# Patient Record
Sex: MALE | Race: WHITE | NOT HISPANIC OR LATINO
[De-identification: names, ages, dates, MRNs, and addresses within clinical notes are randomized per-mention and may not be internally consistent; named-entity substitution may affect disease eponyms.]

---

## 2018-05-24 PROBLEM — Z00.00 ENCOUNTER FOR PREVENTIVE HEALTH EXAMINATION: Status: ACTIVE | Noted: 2018-05-24

## 2023-06-08 DIAGNOSIS — M17.0 BILATERAL PRIMARY OSTEOARTHRITIS OF KNEE: ICD-10-CM

## 2023-06-09 ENCOUNTER — APPOINTMENT (OUTPATIENT)
Dept: ORTHOPEDIC SURGERY | Facility: CLINIC | Age: 68
End: 2023-06-09
Payer: MEDICARE

## 2023-06-09 VITALS
SYSTOLIC BLOOD PRESSURE: 156 MMHG | BODY MASS INDEX: 24.99 KG/M2 | DIASTOLIC BLOOD PRESSURE: 78 MMHG | HEIGHT: 65 IN | HEART RATE: 50 BPM | WEIGHT: 150 LBS

## 2023-06-09 DIAGNOSIS — M16.0 BILATERAL PRIMARY OSTEOARTHRITIS OF HIP: ICD-10-CM

## 2023-06-09 PROCEDURE — 99204 OFFICE O/P NEW MOD 45 MIN: CPT

## 2023-06-09 PROCEDURE — 73522 X-RAY EXAM HIPS BI 3-4 VIEWS: CPT

## 2023-07-06 ENCOUNTER — NON-APPOINTMENT (OUTPATIENT)
Age: 68
End: 2023-07-06

## 2023-09-27 ENCOUNTER — OUTPATIENT (OUTPATIENT)
Dept: OUTPATIENT SERVICES | Facility: HOSPITAL | Age: 68
LOS: 1 days | End: 2023-09-27
Payer: MEDICARE

## 2023-09-27 VITALS
RESPIRATION RATE: 14 BRPM | WEIGHT: 148.37 LBS | SYSTOLIC BLOOD PRESSURE: 156 MMHG | OXYGEN SATURATION: 100 % | TEMPERATURE: 98 F | HEART RATE: 55 BPM | DIASTOLIC BLOOD PRESSURE: 69 MMHG | HEIGHT: 63.5 IN

## 2023-09-27 DIAGNOSIS — Z98.890 OTHER SPECIFIED POSTPROCEDURAL STATES: Chronic | ICD-10-CM

## 2023-09-27 DIAGNOSIS — M16.12 UNILATERAL PRIMARY OSTEOARTHRITIS, LEFT HIP: ICD-10-CM

## 2023-09-27 DIAGNOSIS — Z01.818 ENCOUNTER FOR OTHER PREPROCEDURAL EXAMINATION: ICD-10-CM

## 2023-09-27 LAB
A1C WITH ESTIMATED AVERAGE GLUCOSE RESULT: 6 % — HIGH (ref 4–5.6)
ALBUMIN SERPL ELPH-MCNC: 3.7 G/DL — SIGNIFICANT CHANGE UP (ref 3.3–5)
ALP SERPL-CCNC: 45 U/L — SIGNIFICANT CHANGE UP (ref 30–120)
ALT FLD-CCNC: 32 U/L — SIGNIFICANT CHANGE UP (ref 10–60)
ANION GAP SERPL CALC-SCNC: 8 MMOL/L — SIGNIFICANT CHANGE UP (ref 5–17)
APTT BLD: 35.2 SEC — SIGNIFICANT CHANGE UP (ref 24.5–35.6)
AST SERPL-CCNC: 19 U/L — SIGNIFICANT CHANGE UP (ref 10–40)
BILIRUB SERPL-MCNC: 0.4 MG/DL — SIGNIFICANT CHANGE UP (ref 0.2–1.2)
BLD GP AB SCN SERPL QL: SIGNIFICANT CHANGE UP
BUN SERPL-MCNC: 16 MG/DL — SIGNIFICANT CHANGE UP (ref 7–23)
CALCIUM SERPL-MCNC: 9.7 MG/DL — SIGNIFICANT CHANGE UP (ref 8.4–10.5)
CHLORIDE SERPL-SCNC: 103 MMOL/L — SIGNIFICANT CHANGE UP (ref 96–108)
CO2 SERPL-SCNC: 29 MMOL/L — SIGNIFICANT CHANGE UP (ref 22–31)
CREAT SERPL-MCNC: 1.18 MG/DL — SIGNIFICANT CHANGE UP (ref 0.5–1.3)
EGFR: 67 ML/MIN/1.73M2 — SIGNIFICANT CHANGE UP
ESTIMATED AVERAGE GLUCOSE: 126 MG/DL — HIGH (ref 68–114)
GLUCOSE SERPL-MCNC: 117 MG/DL — HIGH (ref 70–99)
HCT VFR BLD CALC: 40.7 % — SIGNIFICANT CHANGE UP (ref 39–50)
HGB BLD-MCNC: 12.6 G/DL — LOW (ref 13–17)
INR BLD: 0.98 RATIO — SIGNIFICANT CHANGE UP (ref 0.85–1.18)
MCHC RBC-ENTMCNC: 27.3 PG — SIGNIFICANT CHANGE UP (ref 27–34)
MCHC RBC-ENTMCNC: 31 GM/DL — LOW (ref 32–36)
MCV RBC AUTO: 88.1 FL — SIGNIFICANT CHANGE UP (ref 80–100)
MRSA PCR RESULT.: SIGNIFICANT CHANGE UP
NRBC # BLD: 0 /100 WBCS — SIGNIFICANT CHANGE UP (ref 0–0)
PLATELET # BLD AUTO: 228 K/UL — SIGNIFICANT CHANGE UP (ref 150–400)
POTASSIUM SERPL-MCNC: 5 MMOL/L — SIGNIFICANT CHANGE UP (ref 3.5–5.3)
POTASSIUM SERPL-SCNC: 5 MMOL/L — SIGNIFICANT CHANGE UP (ref 3.5–5.3)
PROT SERPL-MCNC: 7.8 G/DL — SIGNIFICANT CHANGE UP (ref 6–8.3)
PROTHROM AB SERPL-ACNC: 10.7 SEC — SIGNIFICANT CHANGE UP (ref 9.5–13)
RBC # BLD: 4.62 M/UL — SIGNIFICANT CHANGE UP (ref 4.2–5.8)
RBC # FLD: 15.6 % — HIGH (ref 10.3–14.5)
S AUREUS DNA NOSE QL NAA+PROBE: SIGNIFICANT CHANGE UP
SODIUM SERPL-SCNC: 140 MMOL/L — SIGNIFICANT CHANGE UP (ref 135–145)
WBC # BLD: 7.35 K/UL — SIGNIFICANT CHANGE UP (ref 3.8–10.5)
WBC # FLD AUTO: 7.35 K/UL — SIGNIFICANT CHANGE UP (ref 3.8–10.5)

## 2023-09-27 PROCEDURE — 87640 STAPH A DNA AMP PROBE: CPT

## 2023-09-27 PROCEDURE — 85027 COMPLETE CBC AUTOMATED: CPT

## 2023-09-27 PROCEDURE — 86900 BLOOD TYPING SEROLOGIC ABO: CPT

## 2023-09-27 PROCEDURE — 83036 HEMOGLOBIN GLYCOSYLATED A1C: CPT

## 2023-09-27 PROCEDURE — 86901 BLOOD TYPING SEROLOGIC RH(D): CPT

## 2023-09-27 PROCEDURE — 80053 COMPREHEN METABOLIC PANEL: CPT

## 2023-09-27 PROCEDURE — 36415 COLL VENOUS BLD VENIPUNCTURE: CPT

## 2023-09-27 PROCEDURE — 86850 RBC ANTIBODY SCREEN: CPT

## 2023-09-27 PROCEDURE — 87641 MR-STAPH DNA AMP PROBE: CPT

## 2023-09-27 PROCEDURE — 85610 PROTHROMBIN TIME: CPT

## 2023-09-27 PROCEDURE — G0463: CPT

## 2023-09-27 PROCEDURE — 85730 THROMBOPLASTIN TIME PARTIAL: CPT

## 2023-09-27 NOTE — H&P PST ADULT - PROBLEM SELECTOR PLAN 1
left THR. medical and cardiac clearances requested. instructed to stop tadalafil and diclofenac 1 week prior to surgery and instructed to take carvedilol AM of surgery with sips of water. gatorade and surgical wash instructions reviewed and verbalized understanding.

## 2023-09-27 NOTE — H&P PST ADULT - SKIN
Violent ideation/threat/speech/Substance abuse/Noncompliance with treatment/Irritability warm and dry/color normal/normal/no rashes/no ulcers Substance abuse/Noncompliance with treatment

## 2023-09-27 NOTE — H&P PST ADULT - COMMENTS
denies any current cold or flu like symptoms, including fever, cough, sinus congestion, body aches or chills   last episode of pneumonia 10 years ago

## 2023-09-27 NOTE — H&P PST ADULT - CONSTITUTIONAL
Transition Of Care Note    Patient discharged from [facility] ED for neck pain, strain. Medical History:     Past Medical History:   Diagnosis Date    Anxiety     Cataract     GERD (gastroesophageal reflux disease)     Glaucoma     HCVD (hypertensive cardiovascular disease)     Ill-defined condition     C3-6 cervical stenosis    Mixed hyperlipidemia     Osteoporosis 12/15    Paroxysmal atrial fibrillation (HCC)     RBBB (right bundle branch block)     Renal stones        Care Manager contacted the patient by telephone to perform post ED discharge assessment. Verified  and zip code with patient as identifiers. Provided introduction to self, and explanation of the Nurse Care Manager role. Medication:   Performed medication reconciliation with patient, and patient verbalizes understanding of administration of home medications. There were no barriers to obtaining medications identified at this time. Support system:  patient and spouse    Discharge Instructions :  Reviewed discharge instructions with patient. Patient verbalizes understanding of discharge instructions and follow-up care. Red Flags:  Numbness or tingling. ¨Weakness. ¨Pain. ·You lose bladder or bowel control. Advance Care Planning:   Patient was offered the opportunity to discuss advance care planning:  no     Does patient have an Advance Directive:  no   If no, did you provide information on Caring Connections?  no     PCP/Specialist follow up: Patient states she will follow up with Katt Braxton MD.  Reviewed red flags with patient, and patient verbalizes understanding. Patient given an opportunity to ask questions. No other clinical/social/functional needs noted. The patient agrees to contact the PCP office for questions related to their healthcare. The patient expressed thanks, offered no additional questions and ended the call. normal/well-groomed/no distress

## 2023-09-27 NOTE — H&P PST ADULT - NSANTHOSAYNRD_GEN_A_CORE
neck/No. ANDRES screening performed.  STOP BANG Legend: 0-2 = LOW Risk; 3-4 = INTERMEDIATE Risk; 5-8 = HIGH Risk neck 18 inches/No. ANDRES screening performed.  STOP BANG Legend: 0-2 = LOW Risk; 3-4 = INTERMEDIATE Risk; 5-8 = HIGH Risk

## 2023-09-27 NOTE — H&P PST ADULT - ALLERGIC/IMMUNOLOGIC
Date of Discharge: 9/5/23    Reason for Discharge: Goals/outcomes met     Problem: PAIN - ADULT  Goal: Verbalizes/displays adequate comfort level or baseline comfort level  Description: Interventions:  - Encourage patient to monitor pain and request assistance  - Assess pain using appropriate pain scale  - Administer analgesics based on type and severity of pain and evaluate response  - Implement non-pharmacological measures as appropriate and evaluate response  - Consider cultural and social influences on pain and pain management  - Notify physician/advanced practitioner if interventions unsuccessful or patient reports new pain  Outcome: Progressing     Problem: INFECTION - ADULT  Goal: Absence or prevention of progression during hospitalization  Description: INTERVENTIONS:  - Assess and monitor for signs and symptoms of infection  - Monitor lab/diagnostic results  - Monitor all insertion sites, i e  indwelling lines, tubes, and drains  - Monitor endotracheal if appropriate and nasal secretions for changes in amount and color  - [place] appropriate cooling/warming therapies per order  - Administer medications as ordered  - Instruct and encourage patient and family to use good hand hygiene technique  - Identify and instruct in appropriate isolation precautions for identified infection/condition  Outcome: Progressing  Goal: Absence of fever/infection during neutropenic period  Description: INTERVENTIONS:  - Monitor WBC    Outcome: Progressing     Problem: SAFETY ADULT  Goal: Patient will remain free of falls  Description: INTERVENTIONS:  - Educate patient/family on patient safety including physical limitations  - Instruct patient to call for assistance with activity   - Consult OT/PT to assist with strengthening/mobility   - Keep Call bell within reach  - Keep bed low and locked with side rails adjusted as appropriate  - Keep care items and personal belongings within reach  - Initiate and maintain comfort rounds  - Make Fall Risk Sign visible to staff  - Offer Toileting every 2 Hours, in advance of need  - Initiate/Maintain bed alarm  - Obtain necessary fall risk management equipment: yellow socks  - Apply yellow socks and bracelet for high fall risk patients  - Consider moving patient to room near nurses station  Outcome: Progressing  Goal: Maintain or return to baseline ADL function  Description: INTERVENTIONS:  -  Assess patient's ability to carry out ADLs; assess patient's baseline for ADL function and identify physical deficits which impact ability to perform ADLs (bathing, care of mouth/teeth, toileting, grooming, dressing, etc )  - Assess/evaluate cause of self-care deficits   - Assess range of motion  - Assess patient's mobility; develop plan if impaired  - Assess patient's need for assistive devices and provide as appropriate  - Encourage maximum independence but intervene and supervise when necessary  - Involve family in performance of ADLs  - Assess for home care needs following discharge   - Consider OT consult to assist with ADL evaluation and planning for discharge  - Provide patient education as appropriate  Outcome: Progressing  Goal: Maintains/Returns to pre admission functional level  Description: INTERVENTIONS:  - Perform BMAT or MOVE assessment daily    - Set and communicate daily mobility goal to care team and patient/family/caregiver  - Collaborate with rehabilitation services on mobility goals if consulted  - Perform Range of Motion 4 times a day  - Reposition patient every 2 hours    - Dangle patient 3 times a day  - Stand patient 3 times a day  - Ambulate patient 3 times a day  - Out of bed to chair 3 times a day   - Out of bed for meals 3 times a day  - Out of bed for toileting  - Record patient progress and toleration of activity level   Outcome: Progressing     Problem: DISCHARGE PLANNING  Goal: Discharge to home or other facility with appropriate resources  Description: INTERVENTIONS:  - Identify barriers to discharge w/patient and caregiver  - Arrange for needed discharge resources and transportation as appropriate  - Identify discharge learning needs (meds, wound care, etc )  - Arrange for interpretive services to assist at discharge as needed  - Refer to Case Management Department for coordinating discharge planning if the patient needs post-hospital services based on physician/advanced practitioner order or complex needs related to functional status, cognitive ability, or social support system  Outcome: Progressing     Problem: Knowledge Deficit  Goal: Patient/family/caregiver demonstrates understanding of disease process, treatment plan, medications, and discharge instructions  Description: Complete learning assessment and assess knowledge base    Interventions:  - Provide teaching at level of understanding  - Provide teaching via preferred learning methods  Outcome: Progressing     Problem: GASTROINTESTINAL - ADULT  Goal: Minimal or absence of nausea and/or vomiting  Description: INTERVENTIONS:  - Administer IV fluids if ordered to ensure adequate hydration  - Maintain NPO status until nausea and vomiting are resolved  - Nasogastric tube if ordered  - Administer ordered antiemetic medications as needed  - Provide nonpharmacologic comfort measures as appropriate  - Advance diet as tolerated, if ordered  - Consider nutrition services referral to assist patient with adequate nutrition and appropriate food choices  Outcome: Progressing  Goal: Maintains adequate nutritional intake  Description: INTERVENTIONS:  - Monitor percentage of each meal consumed  - Identify factors contributing to decreased intake, treat as appropriate  - Assist with meals as needed  - Monitor I&O, weight, and lab values if indicated  - Obtain nutrition services referral as needed  Outcome: Progressing     Problem: GENITOURINARY - ADULT  Goal: Maintains or returns to baseline urinary function  Description: INTERVENTIONS:  - Assess urinary function  - Encourage oral fluids to ensure adequate hydration if ordered  - Administer IV fluids as ordered to ensure adequate hydration  - Administer ordered medications as needed  - Offer frequent toileting  - Follow urinary retention protocol if ordered  Outcome: Progressing negative

## 2023-09-27 NOTE — H&P PST ADULT - GASTROINTESTINAL
details… normal/soft/nontender/nondistended/normal active bowel sounds/no organomegaly/no masses palpable

## 2023-09-27 NOTE — H&P PST ADULT - NSICDXFAMILYHX_GEN_ALL_CORE_FT
FAMILY HISTORY:  Father  Still living? No  Family history of esophageal cancer, Age at diagnosis: Age Unknown  Family history of hypertension, Age at diagnosis: Age Unknown  Family history of type 2 diabetes mellitus, Age at diagnosis: Age Unknown  FHx: coronary artery disease, Age at diagnosis: Age Unknown    Mother  Still living? No  Family history of emphysema, Age at diagnosis: Age Unknown  Family history of heart attack, Age at diagnosis: Age Unknown  Family history of hyperlipidemia, Age at diagnosis: Age Unknown  Family history of lung cancer, Age at diagnosis: Age Unknown  Family history of stroke, Age at diagnosis: Age Unknown    Sibling  Still living? Yes, Estimated age: 51-60  Family history of hyperlipidemia, Age at diagnosis: Age Unknown  Family history of type 2 diabetes mellitus, Age at diagnosis: Age Unknown

## 2023-09-27 NOTE — H&P PST ADULT - NSICDXPROCEDURE_GEN_ALL_CORE_FT
PROCEDURES:  Total replacement of left hip joint by anterior approach 27-Sep-2023 07:14:10  Cesilia Rasheed

## 2023-09-27 NOTE — H&P PST ADULT - HISTORY OF PRESENT ILLNESS
68 yo male presents with 5 year history of bilateral hip pain. He was treated in the past with cortisone injections with temporary relief. Pain now 3/10 at rest and increased to 8-10/10 when standing from a sitting position and with prolonged activity. Pain radiates to the knee. Pain is relieved with diclofenac.   66 yo male presents with 5 year history of bilateral hip pain. He was treated in the past with cortisone injections with temporary relief. Pain now 3/10 at rest and increased to 8-10/10 when standing from a sitting position and with prolonged activity. Pain radiates to the knee. Pain is relieved with diclofenac. Scheduled for left THR on 10/11/23 and right THR on 10/18/23.

## 2023-09-27 NOTE — H&P PST ADULT - MUSCULOSKELETAL
details… bilateral hips/no joint swelling/no joint erythema/no joint warmth/no calf tenderness/decreased ROM/decreased ROM due to pain/strength 5/5 bilateral upper extremities/decreased strength/abnormal gait

## 2023-10-05 PROBLEM — Z86.79 PERSONAL HISTORY OF OTHER DISEASES OF THE CIRCULATORY SYSTEM: Chronic | Status: ACTIVE | Noted: 2023-09-27

## 2023-10-05 PROBLEM — Z92.29 PERSONAL HISTORY OF OTHER DRUG THERAPY: Chronic | Status: ACTIVE | Noted: 2023-09-27

## 2023-10-05 PROBLEM — Z87.898 PERSONAL HISTORY OF OTHER SPECIFIED CONDITIONS: Chronic | Status: ACTIVE | Noted: 2023-09-27

## 2023-10-05 PROBLEM — Z87.01 PERSONAL HISTORY OF PNEUMONIA (RECURRENT): Chronic | Status: ACTIVE | Noted: 2023-09-27

## 2023-10-05 PROBLEM — M54.31 SCIATICA, RIGHT SIDE: Chronic | Status: ACTIVE | Noted: 2023-09-27

## 2023-10-05 PROBLEM — M48.061 SPINAL STENOSIS, LUMBAR REGION WITHOUT NEUROGENIC CLAUDICATION: Chronic | Status: ACTIVE | Noted: 2023-09-27

## 2023-10-05 PROBLEM — M16.11 UNILATERAL PRIMARY OSTEOARTHRITIS, RIGHT HIP: Chronic | Status: ACTIVE | Noted: 2023-09-27

## 2023-10-05 PROBLEM — Z87.438 PERSONAL HISTORY OF OTHER DISEASES OF MALE GENITAL ORGANS: Chronic | Status: ACTIVE | Noted: 2023-09-27

## 2023-10-05 PROBLEM — Z87.2 PERSONAL HISTORY OF DISEASES OF THE SKIN AND SUBCUTANEOUS TISSUE: Chronic | Status: ACTIVE | Noted: 2023-09-27

## 2023-10-05 PROBLEM — M51.26 OTHER INTERVERTEBRAL DISC DISPLACEMENT, LUMBAR REGION: Chronic | Status: ACTIVE | Noted: 2023-09-27

## 2023-10-05 PROBLEM — K59.00 CONSTIPATION, UNSPECIFIED: Chronic | Status: ACTIVE | Noted: 2023-09-27

## 2023-10-05 PROBLEM — I10 ESSENTIAL (PRIMARY) HYPERTENSION: Chronic | Status: ACTIVE | Noted: 2023-09-27

## 2023-10-05 PROBLEM — J43.9 EMPHYSEMA, UNSPECIFIED: Chronic | Status: ACTIVE | Noted: 2023-09-27

## 2023-10-05 PROBLEM — M16.12 UNILATERAL PRIMARY OSTEOARTHRITIS, LEFT HIP: Chronic | Status: ACTIVE | Noted: 2023-09-27

## 2023-10-05 PROBLEM — M47.816 SPONDYLOSIS WITHOUT MYELOPATHY OR RADICULOPATHY, LUMBAR REGION: Chronic | Status: ACTIVE | Noted: 2023-09-27

## 2023-10-05 PROBLEM — Z87.19 PERSONAL HISTORY OF OTHER DISEASES OF THE DIGESTIVE SYSTEM: Chronic | Status: ACTIVE | Noted: 2023-09-27

## 2023-10-05 PROBLEM — Z87.442 PERSONAL HISTORY OF URINARY CALCULI: Chronic | Status: ACTIVE | Noted: 2023-09-27

## 2023-10-05 PROBLEM — I25.10 ATHEROSCLEROTIC HEART DISEASE OF NATIVE CORONARY ARTERY WITHOUT ANGINA PECTORIS: Chronic | Status: ACTIVE | Noted: 2023-09-27

## 2023-10-05 PROBLEM — Z86.16 PERSONAL HISTORY OF COVID-19: Chronic | Status: ACTIVE | Noted: 2023-09-27

## 2023-10-11 ENCOUNTER — APPOINTMENT (OUTPATIENT)
Dept: ORTHOPEDIC SURGERY | Facility: HOSPITAL | Age: 68
End: 2023-10-11

## 2023-10-11 ENCOUNTER — TRANSCRIPTION ENCOUNTER (OUTPATIENT)
Age: 68
End: 2023-10-11

## 2023-10-11 ENCOUNTER — INPATIENT (INPATIENT)
Facility: HOSPITAL | Age: 68
LOS: 0 days | Discharge: HOME | DRG: 470 | End: 2023-10-12
Attending: ORTHOPAEDIC SURGERY | Admitting: ORTHOPAEDIC SURGERY
Payer: COMMERCIAL

## 2023-10-11 VITALS
HEIGHT: 64 IN | TEMPERATURE: 97 F | HEART RATE: 53 BPM | RESPIRATION RATE: 15 BRPM | SYSTOLIC BLOOD PRESSURE: 145 MMHG | WEIGHT: 144.84 LBS | DIASTOLIC BLOOD PRESSURE: 57 MMHG | OXYGEN SATURATION: 100 %

## 2023-10-11 DIAGNOSIS — M16.12 UNILATERAL PRIMARY OSTEOARTHRITIS, LEFT HIP: ICD-10-CM

## 2023-10-11 DIAGNOSIS — Z98.890 OTHER SPECIFIED POSTPROCEDURAL STATES: Chronic | ICD-10-CM

## 2023-10-11 LAB — ABO RH CONFIRMATION: SIGNIFICANT CHANGE UP

## 2023-10-11 PROCEDURE — 99222 1ST HOSP IP/OBS MODERATE 55: CPT

## 2023-10-11 PROCEDURE — 27130 TOTAL HIP ARTHROPLASTY: CPT | Mod: LT

## 2023-10-11 DEVICE — CUP ACET EMPOWR CLUSTER 58MM APLHA H: Type: IMPLANTABLE DEVICE | Status: FUNCTIONAL

## 2023-10-11 DEVICE — SCREW ACET EMPOWR 6.5X35MM: Type: IMPLANTABLE DEVICE | Status: FUNCTIONAL

## 2023-10-11 DEVICE — SCREW ACET SZ 6.5X30MM: Type: IMPLANTABLE DEVICE | Status: FUNCTIONAL

## 2023-10-11 DEVICE — LINER ACET EMPOWR HXA NEUT 40MM ALPHA H: Type: IMPLANTABLE DEVICE | Status: FUNCTIONAL

## 2023-10-11 DEVICE — SLEEVE BIOLOX DELTA OPTION NEUTRAL: Type: IMPLANTABLE DEVICE | Status: FUNCTIONAL

## 2023-10-11 DEVICE — STEM BLADE EMPOWER SZ 11 LAT 132 DEG: Type: IMPLANTABLE DEVICE | Status: FUNCTIONAL

## 2023-10-11 DEVICE — HEAD FEM OPTION CERAMIC DELTA 40MM: Type: IMPLANTABLE DEVICE | Status: FUNCTIONAL

## 2023-10-11 DEVICE — K-WIRE MICROAIRE (THREADED) SINGLE TROCAR 4.8MM X 9": Type: IMPLANTABLE DEVICE | Status: FUNCTIONAL

## 2023-10-11 DEVICE — BONE WAX 2.5GM: Type: IMPLANTABLE DEVICE | Status: FUNCTIONAL

## 2023-10-11 RX ORDER — HYDROMORPHONE HYDROCHLORIDE 2 MG/ML
0.5 INJECTION INTRAMUSCULAR; INTRAVENOUS; SUBCUTANEOUS
Refills: 0 | Status: DISCONTINUED | OUTPATIENT
Start: 2023-10-11 | End: 2023-10-11

## 2023-10-11 RX ORDER — OXYCODONE HYDROCHLORIDE 5 MG/1
5 TABLET ORAL
Refills: 0 | Status: DISCONTINUED | OUTPATIENT
Start: 2023-10-11 | End: 2023-10-12

## 2023-10-11 RX ORDER — SODIUM CHLORIDE 9 MG/ML
500 INJECTION INTRAMUSCULAR; INTRAVENOUS; SUBCUTANEOUS ONCE
Refills: 0 | Status: COMPLETED | OUTPATIENT
Start: 2023-10-11 | End: 2023-10-11

## 2023-10-11 RX ORDER — INFLUENZA VIRUS VACCINE 15; 15; 15; 15 UG/.5ML; UG/.5ML; UG/.5ML; UG/.5ML
0.7 SUSPENSION INTRAMUSCULAR ONCE
Refills: 0 | Status: DISCONTINUED | OUTPATIENT
Start: 2023-10-11 | End: 2023-10-12

## 2023-10-11 RX ORDER — CEFAZOLIN SODIUM 1 G
2000 VIAL (EA) INJECTION ONCE
Refills: 0 | Status: COMPLETED | OUTPATIENT
Start: 2023-10-11 | End: 2023-10-11

## 2023-10-11 RX ORDER — POLYETHYLENE GLYCOL 3350 17 G/17G
17 POWDER, FOR SOLUTION ORAL AT BEDTIME
Refills: 0 | Status: DISCONTINUED | OUTPATIENT
Start: 2023-10-11 | End: 2023-10-12

## 2023-10-11 RX ORDER — ACETAMINOPHEN 500 MG
1000 TABLET ORAL ONCE
Refills: 0 | Status: COMPLETED | OUTPATIENT
Start: 2023-10-12 | End: 2023-10-11

## 2023-10-11 RX ORDER — SENNA PLUS 8.6 MG/1
2 TABLET ORAL AT BEDTIME
Refills: 0 | Status: DISCONTINUED | OUTPATIENT
Start: 2023-10-11 | End: 2023-10-12

## 2023-10-11 RX ORDER — CEFAZOLIN SODIUM 1 G
2000 VIAL (EA) INJECTION EVERY 8 HOURS
Refills: 0 | Status: COMPLETED | OUTPATIENT
Start: 2023-10-11 | End: 2023-10-12

## 2023-10-11 RX ORDER — LOSARTAN POTASSIUM 100 MG/1
100 TABLET, FILM COATED ORAL AT BEDTIME
Refills: 0 | Status: DISCONTINUED | OUTPATIENT
Start: 2023-10-13 | End: 2023-10-12

## 2023-10-11 RX ORDER — PANTOPRAZOLE SODIUM 20 MG/1
40 TABLET, DELAYED RELEASE ORAL
Refills: 0 | Status: DISCONTINUED | OUTPATIENT
Start: 2023-10-11 | End: 2023-10-12

## 2023-10-11 RX ORDER — DICLOFENAC SODIUM 75 MG/1
1 TABLET, DELAYED RELEASE ORAL
Refills: 0 | DISCHARGE

## 2023-10-11 RX ORDER — AMLODIPINE BESYLATE 2.5 MG/1
5 TABLET ORAL AT BEDTIME
Refills: 0 | Status: DISCONTINUED | OUTPATIENT
Start: 2023-10-13 | End: 2023-10-12

## 2023-10-11 RX ORDER — ACETAMINOPHEN 500 MG
1000 TABLET ORAL ONCE
Refills: 0 | Status: COMPLETED | OUTPATIENT
Start: 2023-10-11 | End: 2023-10-11

## 2023-10-11 RX ORDER — TRANEXAMIC ACID 100 MG/ML
1000 INJECTION, SOLUTION INTRAVENOUS ONCE
Refills: 0 | Status: COMPLETED | OUTPATIENT
Start: 2023-10-11 | End: 2023-10-11

## 2023-10-11 RX ORDER — CELECOXIB 200 MG/1
200 CAPSULE ORAL EVERY 12 HOURS
Refills: 0 | Status: DISCONTINUED | OUTPATIENT
Start: 2023-10-11 | End: 2023-10-12

## 2023-10-11 RX ORDER — CHLORHEXIDINE GLUCONATE 213 G/1000ML
1 SOLUTION TOPICAL ONCE
Refills: 0 | Status: COMPLETED | OUTPATIENT
Start: 2023-10-11 | End: 2023-10-11

## 2023-10-11 RX ORDER — ONDANSETRON 8 MG/1
4 TABLET, FILM COATED ORAL EVERY 6 HOURS
Refills: 0 | Status: DISCONTINUED | OUTPATIENT
Start: 2023-10-11 | End: 2023-10-12

## 2023-10-11 RX ORDER — CELECOXIB 200 MG/1
1 CAPSULE ORAL
Qty: 56 | Refills: 0
Start: 2023-10-11 | End: 2023-11-07

## 2023-10-11 RX ORDER — DOCUSATE SODIUM 100 MG
1 CAPSULE ORAL
Refills: 0 | DISCHARGE

## 2023-10-11 RX ORDER — OXYCODONE HYDROCHLORIDE 5 MG/1
10 TABLET ORAL
Refills: 0 | Status: DISCONTINUED | OUTPATIENT
Start: 2023-10-11 | End: 2023-10-12

## 2023-10-11 RX ORDER — OMEPRAZOLE 10 MG/1
1 CAPSULE, DELAYED RELEASE ORAL
Qty: 30 | Refills: 0
Start: 2023-10-11

## 2023-10-11 RX ORDER — ENOXAPARIN SODIUM 100 MG/ML
40 INJECTION SUBCUTANEOUS EVERY 24 HOURS
Refills: 0 | Status: DISCONTINUED | OUTPATIENT
Start: 2023-10-12 | End: 2023-10-12

## 2023-10-11 RX ORDER — APREPITANT 80 MG/1
40 CAPSULE ORAL ONCE
Refills: 0 | Status: COMPLETED | OUTPATIENT
Start: 2023-10-11 | End: 2023-10-11

## 2023-10-11 RX ORDER — ATORVASTATIN CALCIUM 80 MG/1
20 TABLET, FILM COATED ORAL AT BEDTIME
Refills: 0 | Status: DISCONTINUED | OUTPATIENT
Start: 2023-10-11 | End: 2023-10-12

## 2023-10-11 RX ORDER — HYDROMORPHONE HYDROCHLORIDE 2 MG/ML
0.5 INJECTION INTRAMUSCULAR; INTRAVENOUS; SUBCUTANEOUS
Refills: 0 | Status: DISCONTINUED | OUTPATIENT
Start: 2023-10-11 | End: 2023-10-12

## 2023-10-11 RX ORDER — ENOXAPARIN SODIUM 100 MG/ML
40 INJECTION SUBCUTANEOUS
Qty: 5 | Refills: 0
Start: 2023-10-11 | End: 2023-10-15

## 2023-10-11 RX ORDER — SODIUM CHLORIDE 9 MG/ML
1000 INJECTION, SOLUTION INTRAVENOUS
Refills: 0 | Status: DISCONTINUED | OUTPATIENT
Start: 2023-10-11 | End: 2023-10-12

## 2023-10-11 RX ORDER — ONDANSETRON 8 MG/1
4 TABLET, FILM COATED ORAL ONCE
Refills: 0 | Status: DISCONTINUED | OUTPATIENT
Start: 2023-10-11 | End: 2023-10-11

## 2023-10-11 RX ORDER — ACETAMINOPHEN 500 MG
1000 TABLET ORAL EVERY 8 HOURS
Refills: 0 | Status: DISCONTINUED | OUTPATIENT
Start: 2023-10-12 | End: 2023-10-12

## 2023-10-11 RX ORDER — HYDROMORPHONE HYDROCHLORIDE 2 MG/ML
0.2 INJECTION INTRAMUSCULAR; INTRAVENOUS; SUBCUTANEOUS
Refills: 0 | Status: DISCONTINUED | OUTPATIENT
Start: 2023-10-11 | End: 2023-10-11

## 2023-10-11 RX ORDER — DEXAMETHASONE 0.5 MG/5ML
8 ELIXIR ORAL ONCE
Refills: 0 | Status: COMPLETED | OUTPATIENT
Start: 2023-10-12 | End: 2023-10-12

## 2023-10-11 RX ORDER — FAMOTIDINE 10 MG/ML
20 INJECTION INTRAVENOUS EVERY 12 HOURS
Refills: 0 | Status: DISCONTINUED | OUTPATIENT
Start: 2023-10-11 | End: 2023-10-12

## 2023-10-11 RX ORDER — CARVEDILOL PHOSPHATE 80 MG/1
6.25 CAPSULE, EXTENDED RELEASE ORAL EVERY 12 HOURS
Refills: 0 | Status: DISCONTINUED | OUTPATIENT
Start: 2023-10-11 | End: 2023-10-12

## 2023-10-11 RX ORDER — MAGNESIUM HYDROXIDE 400 MG/1
30 TABLET, CHEWABLE ORAL DAILY
Refills: 0 | Status: DISCONTINUED | OUTPATIENT
Start: 2023-10-11 | End: 2023-10-12

## 2023-10-11 RX ORDER — SODIUM CHLORIDE 9 MG/ML
1000 INJECTION, SOLUTION INTRAVENOUS
Refills: 0 | Status: DISCONTINUED | OUTPATIENT
Start: 2023-10-11 | End: 2023-10-11

## 2023-10-11 RX ADMIN — SODIUM CHLORIDE 1000 MILLILITER(S): 9 INJECTION INTRAMUSCULAR; INTRAVENOUS; SUBCUTANEOUS at 15:08

## 2023-10-11 RX ADMIN — Medication 100 MILLIGRAM(S): at 18:46

## 2023-10-11 RX ADMIN — SODIUM CHLORIDE 75 MILLILITER(S): 9 INJECTION, SOLUTION INTRAVENOUS at 13:43

## 2023-10-11 RX ADMIN — CELECOXIB 200 MILLIGRAM(S): 200 CAPSULE ORAL at 02:23

## 2023-10-11 RX ADMIN — POLYETHYLENE GLYCOL 3350 17 GRAM(S): 17 POWDER, FOR SOLUTION ORAL at 22:49

## 2023-10-11 RX ADMIN — ATORVASTATIN CALCIUM 20 MILLIGRAM(S): 80 TABLET, FILM COATED ORAL at 22:49

## 2023-10-11 RX ADMIN — Medication 1000 MILLIGRAM(S): at 18:59

## 2023-10-11 RX ADMIN — CARVEDILOL PHOSPHATE 6.25 MILLIGRAM(S): 80 CAPSULE, EXTENDED RELEASE ORAL at 18:46

## 2023-10-11 RX ADMIN — CHLORHEXIDINE GLUCONATE 1 APPLICATION(S): 213 SOLUTION TOPICAL at 10:00

## 2023-10-11 RX ADMIN — APREPITANT 40 MILLIGRAM(S): 80 CAPSULE ORAL at 10:00

## 2023-10-11 RX ADMIN — Medication 400 MILLIGRAM(S): at 18:45

## 2023-10-11 RX ADMIN — SENNA PLUS 2 TABLET(S): 8.6 TABLET ORAL at 22:49

## 2023-10-11 RX ADMIN — SODIUM CHLORIDE 1000 MILLILITER(S): 9 INJECTION INTRAMUSCULAR; INTRAVENOUS; SUBCUTANEOUS at 18:45

## 2023-10-11 RX ADMIN — Medication 400 MILLIGRAM(S): at 23:51

## 2023-10-11 RX ADMIN — FAMOTIDINE 20 MILLIGRAM(S): 10 INJECTION INTRAVENOUS at 18:45

## 2023-10-11 RX ADMIN — CELECOXIB 200 MILLIGRAM(S): 200 CAPSULE ORAL at 22:48

## 2023-10-11 NOTE — DISCHARGE NOTE PROVIDER - HOSPITAL COURSE
This patient was admitted to [facility] with a history of severe degenerative joint disease of the left hip.  Patient underwent Pre-Surgical Testing and was medically cleared to undergo elective procedure. Patient underwent left total hip replacement by Dr. Miranda. Procedure was well tolerated.  No operative or tyshawn-operative complications arose during patient's hospital course.  Patient received antibiotic according to SCIP guidelines for infection prevention.  Lovenox 40 mg SQ was given for DVT prophylaxis, in addition to the use of SCDs.  Anesthesia, Medical Hospitalist, Physical Therapy and Occupational Therapy were consulted. Patient is stable for discharge with a good prognosis.  Appropriate discharge instructions and medications are provided in this document. Patient is going home with home care (PT/OT/Skilled Nursing)

## 2023-10-11 NOTE — DISCHARGE NOTE PROVIDER - NSDCMRMEDTOKEN_GEN_ALL_CORE_FT
amLODIPine 5 mg oral tablet: 1 tab(s) orally once a day (at bedtime)  carvedilol 6.25 mg oral tablet: 1 tab(s) orally 2 times a day  CeleBREX 200 mg oral capsule: 1 cap(s) orally every 12 hours  famotidine 20 mg oral tablet: 1 tab(s) orally 2 times a day  ferrous sulfate 325 mg (65 mg elemental iron) oral tablet: 1 tab(s) orally 2 times a week  losartan 100 mg oral tablet: 1 tab(s) orally once a day (at bedtime)  Lovenox 40 mg/0.4 mL injectable solution: 40 milligram(s) subcutaneously once a day Last dose in on the morning of 10/17  omeprazole 20 mg oral delayed release capsule: 1 cap(s) orally once a day  rosuvastatin 5 mg oral tablet: 1 tab(s) orally once a day (at bedtime)  tadalafil 5 mg oral tablet: 1 tab(s) orally once a day   acetaminophen 500 mg oral tablet: 2 tab(s) orally every 8 hours  amLODIPine 5 mg oral tablet: 1 tab(s) orally once a day (at bedtime)  carvedilol 6.25 mg oral tablet: 1 tab(s) orally 2 times a day  CeleBREX 200 mg oral capsule: 1 cap(s) orally every 12 hours  famotidine 20 mg oral tablet: 1 tab(s) orally 2 times a day  ferrous sulfate 325 mg (65 mg elemental iron) oral tablet: 1 tab(s) orally 2 times a week  losartan 100 mg oral tablet: 1 tab(s) orally once a day (at bedtime)  Lovenox 40 mg/0.4 mL injectable solution: 40 milligram(s) subcutaneously once a day Last dose in on the morning of 10/17  omeprazole 20 mg oral delayed release capsule: 1 cap(s) orally once a day  oxyCODONE 5 mg oral tablet: 1 tab(s) orally every 4 hours as needed for  moderate pain May take 2 tabs for severe pain MDD: 6  rosuvastatin 5 mg oral tablet: 1 tab(s) orally once a day (at bedtime)  tadalafil 5 mg oral tablet: 1 tab(s) orally once a day

## 2023-10-11 NOTE — DISCHARGE NOTE PROVIDER - NSDCCPCAREPLAN_GEN_ALL_CORE_FT
PRINCIPAL DISCHARGE DIAGNOSIS  Diagnosis: Primary osteoarthritis of left hip  Assessment and Plan of Treatment: You have had an Anterior Total Hip Replacement. Follow instructions given to you by your surgeon.   PT/OT Total Hip Protocol; Ambulation, transfers, stairs, & ADLs  Full weight bearing both legs; Walker/cane use as instructed by PT/OT  Anterior THR precautions for 4 weeks: No straight leg raise; No external rotation of hip when extended-standing or lying flat; No hyperextension of hip when standing (kickback)  • Ice 30 minutes several times daily with at least a 60 minute break in between icing sessions  Daily dressing changes if incision is not completely dry.  If inicision is dry, it may be left open to air.  Keep incision clean. DO NOT APPLY ANYTHING to incision site (salves/ointments/creams).  May shower post-op if no drainage from incision.  Do not scrub incision site. Pat dry after shower.  Silverlon dressing is waterproof.  You may shower, but do not aim shower stream at surgical site. Pat dry after shower.   Remove Silverlon dressing on postop day #7. May shower after Silverlon removal.  Prineo tape/Suture removal on/near after Post Op Day # 14.  See PCP in office approximately 2-3 weeks from discharge for exam/labwork.       PRINCIPAL DISCHARGE DIAGNOSIS  Diagnosis: Primary osteoarthritis of left hip  Assessment and Plan of Treatment: You have had an Anterior Total Hip Replacement. Follow instructions given to you by your surgeon.   PT/OT Total Hip Protocol; Ambulation, transfers, stairs, & ADLs  Full weight bearing both legs; Walker/cane use as instructed by PT/OT  Anterior THR precautions for 4 weeks: No straight leg raise; No external rotation of hip when extended-standing or lying flat; No hyperextension of hip when standing (kickback)  • Ice 30 minutes several times daily with at least a 60 minute break in between icing sessions  [place] dressing is over your hip wound.  It is waterproof and you may shower.  Do not aim shower stream at surgical site and pat dry with clean towel after showering.   Remove Silverlon dressing 7 days after surgery and leave wound open to air.   Keep incision clean. DO NOT APPLY ANYTHING to incision site (salves/ointments/creams).  May shower post-op if no drainage from incision.  Do not scrub incision site. Pat dry after shower.  Prineo tape removal on/near after Post Op Day # 14.  See PCP in office approximately 2-3 weeks from discharge for exam/labwork.

## 2023-10-11 NOTE — CONSULT NOTE ADULT - SUBJECTIVE AND OBJECTIVE BOX
Patient is a 67y old  Male who presents with a chief complaint of left hip osteoarthritis (11 Oct 2023 14:21)      HPI:  67M presents with 5 year history of bilateral hip pain. He was treated in the past with cortisone injections with temporary relief. Pain now 3/10 at rest and increased to 8-10/10 when standing from a sitting position and with prolonged activity. Pain radiates to the knee. Pain is relieved with diclofenac.   Now s/p left THR.  (right THR 10/18/23)   Currently comfortable in bed.  minimal pain.       REVIEW OF SYSTEMS:  CONSTITUTIONAL: No fever, weight loss, or fatigue  EYES: No eye pain, visual disturbances, or discharge  ENMT:  No difficulty hearing, tinnitus, vertigo; No sinus or throat pain  NECK: No pain or stiffness  BREASTS: No pain, masses, or nipple discharge  RESPIRATORY: No cough, wheezing, chills or hemoptysis; No shortness of breath  CARDIOVASCULAR: No chest pain, palpitations, dizziness, or leg swelling  GASTROINTESTINAL: No abdominal or epigastric pain. No nausea, vomiting, or hematemesis; No diarrhea or constipation. No melena or hematochezia.  GENITOURINARY: No dysuria, frequency, hematuria, or incontinence  NEUROLOGICAL: No headaches, memory loss, loss of strength, numbness, or tremors  SKIN: No itching, burning, rashes, or lesions   LYMPH NODES: No enlarged glands  ENDOCRINE: No heat or cold intolerance; No hair loss  MUSCULOSKELETAL: No muscle or back pain  PSYCHIATRIC: No depression, anxiety, mood swings, or difficulty sleeping  HEME/LYMPH: No easy bruising, or bleeding gums  ALLERGY AND IMMUNOLOGIC: No hives or eczema    PAST MEDICAL & SURGICAL HISTORY:  Hyperlipidemia    Hypertension    Mild coronary artery disease    History of irregular heartbeat    History of pneumonia    History of eczema    History of COVID-19    COVID-19 vaccine series completed    History of erectile dysfunction    History of gastritis    History of gastroesophageal reflux (GERD)    Mild emphysema    History of snoring    Constipation    History of kidney stones    History of elevated PSA    Lumbar spinal stenosis    Lumbar disc herniation    Primary osteoarthritis of lumbar spine    Primary osteoarthritis of left hip    Primary osteoarthritis of right hip    Sciatic pain, right    History of prostate biopsy      History of lung biopsy          SOCIAL HISTORY:  Residence: [ ] [facility]  [ ] [facility]  [ ] Community  [ ] Substance abuse:   [ ] Tobacco:   [ ] Alcohol use:     Allergies    No Known Allergies    Intolerances        MEDICATIONS  (STANDING):  influenza  Vaccine (HIGH DOSE) 0.7 milliLiter(s) IntraMuscular once  lactated ringers. 1000 milliLiter(s) (75 mL/Hr) IV Continuous <Continuous>    MEDICATIONS  (PRN):  HYDROmorphone  Injectable 0.5 milliGRAM(s) IV Push every 10 minutes PRN Severe Pain (7 - 10)  HYDROmorphone  Injectable 0.2 milliGRAM(s) IV Push every 10 minutes PRN Moderate Pain (4 - 6)  ondansetron Injectable 4 milliGRAM(s) IV Push once PRN Nausea and/or Vomiting      FAMILY HISTORY:  Family history of heart attack (Mother)    Family history of stroke (Mother)    Family history of lung cancer (Mother)    Family history of emphysema (Mother)    Family history of hyperlipidemia (Mother, Sibling)    FHx: coronary artery disease (Father)    Family history of esophageal cancer (Father)    Family history of hypertension (Father)    Family history of type 2 diabetes mellitus (Father, Sibling)        Vital Signs Last 24 Hrs  T(C): 36.3 (11 Oct 2023 13:15), Max: 36.3 (11 Oct 2023 13:15)  T(F): 97.3 (11 Oct 2023 13:15), Max: 97.3 (11 Oct 2023 13:15)  HR: 55 (11 Oct 2023 15:45) (46 - 58)  BP: 140/49 (11 Oct 2023 15:45) (127/43 - 154/44)  BP(mean): --  RR: 14 (11 Oct 2023 15:45) (13 - 17)  SpO2: 99% (11 Oct 2023 15:45) (96% - 100%)    Parameters below as of 11 Oct 2023 14:45  Patient On (Oxygen Delivery Method): room air        PHYSICAL EXAM:    GENERAL: NAD, well-groomed, well-developed  HEAD:  Atraumatic, Normocephalic  EYES: EOMI, PERRLA, conjunctiva and sclera clear  ENMT: No tonsillar erythema, exudates, or enlargement; Moist mucous membranes, Good dentition, No lesions  NECK: Supple, No JVD, Normal thyroid  NERVOUS SYSTEM:  Alert & Oriented X3, Good concentration; Moving all 4 extremities; No gross sensory deficits  CHEST/LUNG: Clear to auscultation bilaterally; No rales, rhonchi, wheezing, or rubs  HEART: Regular rate and rhythm; No murmurs, rubs, or gallops  ABDOMEN: Soft, Nontender, Nondistended; Bowel sounds present  EXTREMITIES:  2+ Peripheral Pulses, No clubbing, cyanosis, or edema  LYMPH: No lymphadenopathy noted  /RECTAL: Not examined  BREAST: Not examined  SKIN: No rashes or lesions  INCISION:     LABS:              CAPILLARY BLOOD GLUCOSE          RADIOLOGY & ADDITIONAL STUDIES:    EKG:   Personally Reviewed:  [ ] YES     Imaging:   Personally Reviewed:  [ ] YES     Consultant(s) Notes Reviewed:      Care Discussed with Consultants/Other Providers:                Patient is a 67y old  Male who presents with a chief complaint of left hip osteoarthritis (11 Oct 2023 14:21)      HPI:  67M presents with 5 year history of bilateral hip pain. He was treated in the past with cortisone injections with temporary relief. Pain now 3/10 at rest and increased to 8-10/10 when standing from a sitting position and with prolonged activity. Pain radiates to the knee. Pain is relieved with diclofenac.   Now s/p left THR.  (right THR 10/18/23)   Currently comfortable in bed.  minimal pain.       REVIEW OF SYSTEMS:  CONSTITUTIONAL: No fever, weight loss, or fatigue  EYES: No eye pain, visual disturbances, or discharge  ENMT:  No difficulty hearing, tinnitus, vertigo; No sinus or throat pain  NECK: No pain or stiffness  BREASTS: No pain, masses, or nipple discharge  RESPIRATORY: No cough, wheezing, chills or hemoptysis; No shortness of breath  CARDIOVASCULAR: No chest pain, palpitations, dizziness, or leg swelling  GASTROINTESTINAL: No abdominal or epigastric pain. No nausea, vomiting, or hematemesis; No diarrhea or constipation. No melena or hematochezia.  GENITOURINARY: No dysuria, frequency, hematuria, or incontinence  NEUROLOGICAL: No headaches, memory loss, loss of strength, numbness, or tremors  SKIN: No itching, burning, rashes, or lesions   LYMPH NODES: No enlarged glands  ENDOCRINE: No heat or cold intolerance; No hair loss  MUSCULOSKELETAL: No muscle or back pain  PSYCHIATRIC: No depression, anxiety, mood swings, or difficulty sleeping  HEME/LYMPH: No easy bruising, or bleeding gums  ALLERGY AND IMMUNOLOGIC: No hives or eczema    PAST MEDICAL & SURGICAL HISTORY:  Hyperlipidemia    Hypertension    Mild coronary artery disease    History of irregular heartbeat    History of pneumonia    History of eczema    History of COVID-19    COVID-19 vaccine series completed    History of erectile dysfunction    History of gastritis    History of gastroesophageal reflux (GERD)    Mild emphysema    History of snoring    Constipation    History of kidney stones    History of elevated PSA    Lumbar spinal stenosis    Lumbar disc herniation    Primary osteoarthritis of lumbar spine    Primary osteoarthritis of left hip    Primary osteoarthritis of right hip    Sciatic pain, right    History of prostate biopsy      History of lung biopsy          SOCIAL HISTORY:  Residence: [ ] [facility]  [ ] [facility]  [ ] Community  [ ] Substance abuse: denies  [ ] Tobacco: 1ppd  [ ] Alcohol use: occasional glass of wine    Allergies  No Known Allergies    MEDICATIONS  (STANDING):  influenza  Vaccine (HIGH DOSE) 0.7 milliLiter(s) IntraMuscular once  lactated ringers. 1000 milliLiter(s) (75 mL/Hr) IV Continuous <Continuous>    MEDICATIONS  (PRN):  HYDROmorphone  Injectable 0.5 milliGRAM(s) IV Push every 10 minutes PRN Severe Pain (7 - 10)  HYDROmorphone  Injectable 0.2 milliGRAM(s) IV Push every 10 minutes PRN Moderate Pain (4 - 6)  ondansetron Injectable 4 milliGRAM(s) IV Push once PRN Nausea and/or Vomiting      FAMILY HISTORY:  Family history of heart attack (Mother)    Family history of stroke (Mother)    Family history of lung cancer (Mother)    Family history of emphysema (Mother)    Family history of hyperlipidemia (Mother, Sibling)    FHx: coronary artery disease (Father)    Family history of esophageal cancer (Father)    Family history of hypertension (Father)    Family history of type 2 diabetes mellitus (Father, Sibling)        Vital Signs Last 24 Hrs  T(C): 36.3 (11 Oct 2023 13:15), Max: 36.3 (11 Oct 2023 13:15)  T(F): 97.3 (11 Oct 2023 13:15), Max: 97.3 (11 Oct 2023 13:15)  HR: 55 (11 Oct 2023 15:45) (46 - 58)  BP: 140/49 (11 Oct 2023 15:45) (127/43 - 154/44)  BP(mean): --  RR: 14 (11 Oct 2023 15:45) (13 - 17)  SpO2: 99% (11 Oct 2023 15:45) (96% - 100%)    Parameters below as of 11 Oct 2023 14:45  Patient On (Oxygen Delivery Method): room air        PHYSICAL EXAM:    GENERAL: NAD, well-groomed, well-developed  HEAD:  Atraumatic, Normocephalic  EYES:  conjunctiva and sclera clear  ENMT:   Moist mucous membranes  NECK: Supple, No JVD   NERVOUS SYSTEM:  Alert & Oriented X3, Good concentration; Moving all 4 extremities; No gross sensory deficits  CHEST/LUNG: Clear to auscultation bilaterally;    HEART: Regular rate and rhythm;    ABDOMEN: Soft, Nontender, Nondistended; Bowel sounds present  EXTREMITIES:  2+ Peripheral Pulses, No clubbing, cyanosis, or edema  LYMPH: No lymphadenopathy noted  /RECTAL: Not examined  BREAST: Not examined  SKIN: No rashes or lesions  INCISION: dressing dry and intact    LABS:              CAPILLARY BLOOD GLUCOSE          RADIOLOGY & ADDITIONAL STUDIES:    EKG: NSR  Personally Reviewed:  [ ] YES     Imaging:   Personally Reviewed:  [ ] YES     Consultant(s) Notes Reviewed:      Care Discussed with Consultants/Other Providers:

## 2023-10-11 NOTE — PHYSICAL THERAPY INITIAL EVALUATION ADULT - ADDITIONAL COMMENTS
Pt lives with wife in a private home, there are 8 stairs +HR to enter and 13 stairs +HR to the primary bedroom/bathroom. Pt has a walk in shower. PTA pt was independent with ADLs and ambulation. Pt owns a RW and commode.

## 2023-10-11 NOTE — DISCHARGE NOTE PROVIDER - NSDCFUSCHEDAPPT_GEN_ALL_CORE_FT
Shawanda Miranda  [facility]  [facility] [address] [facility]  Scheduled Appointment: 10/18/2023    Shawanda Miranda  [facility]  Scheduled Appointment: 10/18/2023    Shawanda Miranda  [facility]  ORTHOSURG [address]  Scheduled Appointment: 10/27/2023    Shawanda Miranda  [facility]  ORTHOSURG [address]  Scheduled Appointment: 12/08/2023

## 2023-10-11 NOTE — CONSULT NOTE ADULT - ASSESSMENT
67M s/p Left THR    OA left hip  Pain Management: acceptable- continue current care Tylenol ATC/Celebrex ATC/ Oxycodone PRN  Continue PT/OT  DVT proph: [  ] low risk - Aspirin  [ x ] high risk -Lovenox [  ] high risk - Eliquis [  ] other:_________  DC plan:  [x  ] Home with HC   OA of right hip - Right Hip THR planned for 10/18      GERD/ Gastritis  continue PPI    CAD/ HTN/ HLD  Continue Coreg, Amlodipine, Losartan and Norvasc with hold parameters  Continue Statin    BPH  continue Tadalfil  monitor voiding

## 2023-10-11 NOTE — PHYSICAL THERAPY INITIAL EVALUATION ADULT - GAIT DEVIATIONS NOTED, PT EVAL
decreased karla/increased time in double stance/decreased step length/decreased weight-shifting ability

## 2023-10-11 NOTE — DISCHARGE NOTE PROVIDER - NSDCFUADDINST_GEN_ALL_CORE_FT
- Call your doctor if you experience:  • An increase in pain not controlled by pain medication or change in activity or  position.  • Temperature greater than 101° F.  • Redness, increased swelling or foul smelling drainage from or around the  incision.  • Numbness, tingling or a change in color or temperature of the operative leg.  • Call your doctor immediately if you experience chest pain, shortness of breath or calf pain.  Pain medicine has been prescribed for you, as needed, and it often causes constipation.  Take docusate sodium (Colace) 100mg 3x daily, while taking narcotic pain medication.   For Constipation :   • Increase your water intake. Drink at least 8 glasses of water daily.  • Try adding fiber to your diet by eating fruits, vegetables and foods that are rich in grains.  • If you do experience constipation, you may take an over-the-counter laxative such as, Senokot, Miralax or  Milk of Magnesia.

## 2023-10-11 NOTE — DISCHARGE NOTE PROVIDER - NSDCCPTREATMENT_GEN_ALL_CORE_FT
PRINCIPAL PROCEDURE  Procedure: Total left hip replacement  Findings and Treatment: left hip osteoarthritis

## 2023-10-11 NOTE — PHYSICAL THERAPY INITIAL EVALUATION ADULT - PERTINENT HX OF CURRENT PROBLEM, REHAB EVAL
Pt is a 68 y/o male with left hip primary OA. Pt is s/p left total hip replacement anterior approach on 10/11/23.

## 2023-10-11 NOTE — DISCHARGE NOTE PROVIDER - NSDCCAREPROVSEEN_GEN_ALL_CORE_FT
Marcelle, Spencer De Anda, Maury Sorto, Dimas LUX Marcelle, Spencer De Anda, Maury Sorto, Dimas Miranda, Shawanda

## 2023-10-11 NOTE — DISCHARGE NOTE PROVIDER - CARE PROVIDER_API CALL
Shawanda Miranda  Orthopaedic Surgery  [address], [address]  [place] [address]  Phone: [phone]  Fax: [phone]  Scheduled Appointment: 10/18/2023 08:30 AM

## 2023-10-12 ENCOUNTER — TRANSCRIPTION ENCOUNTER (OUTPATIENT)
Age: 68
End: 2023-10-12

## 2023-10-12 VITALS
OXYGEN SATURATION: 97 % | SYSTOLIC BLOOD PRESSURE: 138 MMHG | TEMPERATURE: 98 F | RESPIRATION RATE: 18 BRPM | HEART RATE: 55 BPM | DIASTOLIC BLOOD PRESSURE: 66 MMHG

## 2023-10-12 LAB
ANION GAP SERPL CALC-SCNC: 7 MMOL/L — SIGNIFICANT CHANGE UP (ref 5–17)
BUN SERPL-MCNC: 20 MG/DL — SIGNIFICANT CHANGE UP (ref 7–23)
CALCIUM SERPL-MCNC: 8.9 MG/DL — SIGNIFICANT CHANGE UP (ref 8.4–10.5)
CHLORIDE SERPL-SCNC: 103 MMOL/L — SIGNIFICANT CHANGE UP (ref 96–108)
CO2 SERPL-SCNC: 25 MMOL/L — SIGNIFICANT CHANGE UP (ref 22–31)
CREAT SERPL-MCNC: 0.95 MG/DL — SIGNIFICANT CHANGE UP (ref 0.5–1.3)
EGFR: 87 ML/MIN/1.73M2 — SIGNIFICANT CHANGE UP
GLUCOSE SERPL-MCNC: 157 MG/DL — HIGH (ref 70–99)
HCT VFR BLD CALC: 31.8 % — LOW (ref 39–50)
HGB BLD-MCNC: 10.1 G/DL — LOW (ref 13–17)
MCHC RBC-ENTMCNC: 27.2 PG — SIGNIFICANT CHANGE UP (ref 27–34)
MCHC RBC-ENTMCNC: 31.8 GM/DL — LOW (ref 32–36)
MCV RBC AUTO: 85.5 FL — SIGNIFICANT CHANGE UP (ref 80–100)
NRBC # BLD: 0 /100 WBCS — SIGNIFICANT CHANGE UP (ref 0–0)
PLATELET # BLD AUTO: 188 K/UL — SIGNIFICANT CHANGE UP (ref 150–400)
POTASSIUM SERPL-MCNC: 4.5 MMOL/L — SIGNIFICANT CHANGE UP (ref 3.5–5.3)
POTASSIUM SERPL-SCNC: 4.5 MMOL/L — SIGNIFICANT CHANGE UP (ref 3.5–5.3)
RBC # BLD: 3.72 M/UL — LOW (ref 4.2–5.8)
RBC # FLD: 14.8 % — HIGH (ref 10.3–14.5)
SODIUM SERPL-SCNC: 135 MMOL/L — SIGNIFICANT CHANGE UP (ref 135–145)
WBC # BLD: 18.13 K/UL — HIGH (ref 3.8–10.5)
WBC # FLD AUTO: 18.13 K/UL — HIGH (ref 3.8–10.5)

## 2023-10-12 PROCEDURE — 97165 OT EVAL LOW COMPLEX 30 MIN: CPT

## 2023-10-12 PROCEDURE — 80048 BASIC METABOLIC PNL TOTAL CA: CPT

## 2023-10-12 PROCEDURE — C1889: CPT

## 2023-10-12 PROCEDURE — C1776: CPT

## 2023-10-12 PROCEDURE — 27130 TOTAL HIP ARTHROPLASTY: CPT

## 2023-10-12 PROCEDURE — 76000 FLUOROSCOPY <1 HR PHYS/QHP: CPT

## 2023-10-12 PROCEDURE — 99232 SBSQ HOSP IP/OBS MODERATE 35: CPT

## 2023-10-12 PROCEDURE — 97116 GAIT TRAINING THERAPY: CPT

## 2023-10-12 PROCEDURE — 97161 PT EVAL LOW COMPLEX 20 MIN: CPT

## 2023-10-12 PROCEDURE — 36415 COLL VENOUS BLD VENIPUNCTURE: CPT

## 2023-10-12 PROCEDURE — C1713: CPT

## 2023-10-12 PROCEDURE — 97110 THERAPEUTIC EXERCISES: CPT

## 2023-10-12 PROCEDURE — 85027 COMPLETE CBC AUTOMATED: CPT

## 2023-10-12 PROCEDURE — 94664 DEMO&/EVAL PT USE INHALER: CPT

## 2023-10-12 PROCEDURE — 97530 THERAPEUTIC ACTIVITIES: CPT

## 2023-10-12 RX ORDER — OXYCODONE HYDROCHLORIDE 5 MG/1
1 TABLET ORAL
Qty: 42 | Refills: 0
Start: 2023-10-12 | End: 2023-10-18

## 2023-10-12 RX ORDER — TADALAFIL 10 MG/1
1 TABLET, FILM COATED ORAL
Refills: 0 | DISCHARGE

## 2023-10-12 RX ORDER — ACETAMINOPHEN 500 MG
2 TABLET ORAL
Qty: 0 | Refills: 0 | DISCHARGE
Start: 2023-10-12

## 2023-10-12 RX ADMIN — Medication 1000 MILLIGRAM(S): at 13:18

## 2023-10-12 RX ADMIN — CELECOXIB 200 MILLIGRAM(S): 200 CAPSULE ORAL at 09:12

## 2023-10-12 RX ADMIN — FAMOTIDINE 20 MILLIGRAM(S): 10 INJECTION INTRAVENOUS at 05:45

## 2023-10-12 RX ADMIN — Medication 1000 MILLIGRAM(S): at 05:45

## 2023-10-12 RX ADMIN — Medication 1000 MILLIGRAM(S): at 00:10

## 2023-10-12 RX ADMIN — ENOXAPARIN SODIUM 40 MILLIGRAM(S): 100 INJECTION SUBCUTANEOUS at 09:12

## 2023-10-12 RX ADMIN — Medication 101.6 MILLIGRAM(S): at 05:45

## 2023-10-12 RX ADMIN — Medication 100 MILLIGRAM(S): at 03:40

## 2023-10-12 RX ADMIN — CARVEDILOL PHOSPHATE 6.25 MILLIGRAM(S): 80 CAPSULE, EXTENDED RELEASE ORAL at 05:45

## 2023-10-12 RX ADMIN — Medication 1000 MILLIGRAM(S): at 06:00

## 2023-10-12 RX ADMIN — CELECOXIB 200 MILLIGRAM(S): 200 CAPSULE ORAL at 09:17

## 2023-10-12 RX ADMIN — Medication 1000 MILLIGRAM(S): at 13:23

## 2023-10-12 NOTE — CARE COORDINATION ASSESSMENT. - NSPASTMEDSURGHISTORY_GEN_ALL_CORE_FT
PAST MEDICAL & SURGICAL HISTORY:  Hyperlipidemia      Sciatic pain, right      Primary osteoarthritis of right hip      Primary osteoarthritis of left hip      Primary osteoarthritis of lumbar spine      Lumbar disc herniation      Lumbar spinal stenosis      History of elevated PSA      History of kidney stones      Constipation      History of snoring      Mild emphysema      History of gastroesophageal reflux (GERD)      History of gastritis      History of erectile dysfunction      COVID-19 vaccine series completed      History of COVID-19      History of eczema      History of pneumonia      History of irregular heartbeat      Mild coronary artery disease      Hypertension      History of lung biopsy      History of prostate biopsy

## 2023-10-12 NOTE — PROGRESS NOTE ADULT - ASSESSMENT
67M s/p Left THR    OA left hip  Pain Management: acceptable- continue current care Tylenol ATC/Celebrex ATC/ Oxycodone PRN  Continue PT/OT  DVT proph: [  ] low risk - Aspirin  [ x ] high risk -Lovenox [  ] high risk - Eliquis [  ] other:_________  DC plan:  [x  ] Home with HC   OA of right hip - Right Hip THR planned for 10/18, at this time no medical objection to proceed as planned.       GERD/ Gastritis  continue PPI    CAD/ HTN/ HLD  Continue Coreg, Amlodipine, Losartan and Norvasc with hold parameters  Continue Statin    BPH  continue Tadalfil  monitor voiding

## 2023-10-12 NOTE — PHARMACOTHERAPY INTERVENTION NOTE - COMMENTS
Transition of Care video discharge education - medication calendar given to patient   
Admission medication reconciliation POD1 
Meds to Bed (M2B) received from [facility] pharmacy were delivered to patient at bed side.  Pharmacy Staff did a final review/inquiry with the patient to ensure understanding and use of medication that was provided. Patient questions or concerns about their discharge drug therapy were addressed for their transition home.  All issues were addressed and well wishes provided.

## 2023-10-12 NOTE — OCCUPATIONAL THERAPY INITIAL EVALUATION ADULT - LEVEL OF INDEPENDENCE: DRESS LOWER BODY, OT EVAL
Pt educated on LB dressing technique, returning demonstration independently using sock aid- OT educvated pt on how to purchase sock aid reports spouse will assist/independent

## 2023-10-12 NOTE — DISCHARGE NOTE NURSING/CASE MANAGEMENT/SOCIAL WORK - NSSCNAMETXT_GEN_ALL_CORE
[facility] care agency [phone] will reach out to you within 24-72 hours of your discharge to schedule home care visit/eval appointment with you. Please call agency for any queries regarding home care services

## 2023-10-12 NOTE — DISCHARGE NOTE NURSING/CASE MANAGEMENT/SOCIAL WORK - NSDCPEFALRISK_GEN_ALL_CORE
For information on Fall & Injury Prevention, visit: [email or web address] OR  [email or web address] OR  https://www.cdc.gov/steadi/patient.html

## 2023-10-12 NOTE — CARE COORDINATION ASSESSMENT. - NSDCPLANSERVICES_GEN_ALL_CORE
CM met with patient at bedside.  Patient. A&O x 4. Pt s/p  PROCEDURES: Total left hip replacement.   Pt  resting comfortably / Pt has no complaints at this time.  CM explained role of CM and availability to assist with discharge planning throughout stay.   Provided CM contact information and pt. verbalized understanding. Patient lives in a private house with his wife, 8 +13 steps with Hr to navigate. Prior to surgery patient was ind in adls. Patient is having his second hip replaced in 1 weeks and will be going home on Lovenox. Patient step son will be administering the injections. Patient identified his wife  as his caregiver at home who will assist him during his recuperation and will transport him home and to MD appointments.   Pt. is agreeable with PT/OT's recommendations for d/c plan to home with HC/PT.  CM explained home care expectations, process, insurance provisions and home safety with good understanding.   Offered list of [facility] and [facility] and  [facility] as second [facility]. [facility] not available to accept  at this time, patient made aware and agreeable to use [facility]. Provided discharge resources folder. CM made referral accordingly.  Informed them of Anticipated  DC date for today 10/12/2023 and for SOC tomorrow 10/13/2023.  Patient provided with info on the transitional care management/health solutions team who will be following her upon DC.  Pt verbalizing understanding and in agreement with d/c plans.   DME: Pt has a RW, commode at home.  PCP: Dr Rueda   Pt stated he will be receiving meds to bed from [facility] pharmacy prior to DC./Home Care

## 2023-10-12 NOTE — PROGRESS NOTE ADULT - SUBJECTIVE AND OBJECTIVE BOX
Orthopaedic Post Op Note    Procedure: Left  Anterior THR  Surgeon: Ruth    67y Male comfortable, without complaints. Ambulated with PT. Tolerating diet. Voiding.   Reported pain score =1/10. Multimodal pain regimen reviewed.   Denies N/V, CP, SOB, numbness/tingling of extremities.    PE:  Vital Signs Last 24 Hrs  T(C): 36.8 (11 Oct 2023 17:13), Max: 36.8 (11 Oct 2023 17:13)  T(F): 98.3 (11 Oct 2023 17:13), Max: 98.3 (11 Oct 2023 17:13)  HR: 56 (11 Oct 2023 17:13) (46 - 58)  BP: 179/57 (11 Oct 2023 17:13) (127/43 - 179/57)  RR: 14 (11 Oct 2023 17:13) (12 - 17)  SpO2: 100% (11 Oct 2023 17:13) (96% - 100%)    Parameters below as of 11 Oct 2023 16:15  Patient On (Oxygen Delivery Method): room air      General: Pt alert and oriented, NAD  Abd: soft, NT, ND  Left Hip Silverlon Dressing: C/D/I with no erythema or discharge noted, Mild blood spot noted but not even completely saturated on dressing. approx 4cm in radius.   Bilateral LEs:  Motor:   5/5 dorsiflexion, plantarflexion, EHL  Sensation intact to LT  + DP Pulses  SCDs in place    A/P: 67y Male stable POD#0 s/p Left Anterior THR   -  Acetaminophen, Celebrex, Oxycodone, Dilaudid   for Pain Control   - DVT ppx:  Lovenox 40 SQ daily for 6 days (until next hip surgery) and ambulation as tolerated  - Radha op IV abx: Ancef  - Celebrex for HO ppx  - Incentive Spirometry  - Anterior total hip precautions  - PT, OT per protocol, WBAT  - Medicine Comanagement  - F/U AM Labs  DCP = home tomorrow pending PT, OT, medical clearance       
Patient is a 67y old  Male who presents with a chief complaint of left hip osteoarthritis (11 Oct 2023 14:21)      INTERVAL HPI/OVERNIGHT EVENTS:  feeling well, pain controlled, wants to go home.       MEDICATIONS  (STANDING):  acetaminophen     Tablet .. 1000 milliGRAM(s) Oral every 8 hours  amLODIPine   Tablet 5 milliGRAM(s) Oral at bedtime  atorvastatin 20 milliGRAM(s) Oral at bedtime  carvedilol 6.25 milliGRAM(s) Oral every 12 hours  celecoxib 200 milliGRAM(s) Oral every 12 hours  enoxaparin Injectable 40 milliGRAM(s) SubCutaneous every 24 hours  famotidine    Tablet 20 milliGRAM(s) Oral every 12 hours  influenza  Vaccine (HIGH DOSE) 0.7 milliLiter(s) IntraMuscular once  lactated ringers. 1000 milliLiter(s) (100 mL/Hr) IV Continuous <Continuous>  pantoprazole    Tablet 40 milliGRAM(s) Oral before breakfast  polyethylene glycol 3350 17 Gram(s) Oral at bedtime  senna 2 Tablet(s) Oral at bedtime    MEDICATIONS  (PRN):  HYDROmorphone  Injectable 0.5 milliGRAM(s) IV Push every 3 hours PRN breakthrough pain  magnesium hydroxide Suspension 30 milliLiter(s) Oral daily PRN Constipation  ondansetron Injectable 4 milliGRAM(s) IV Push every 6 hours PRN Nausea and/or Vomiting  oxyCODONE    IR 5 milliGRAM(s) Oral every 3 hours PRN Moderate Pain (4 - 6)  oxyCODONE    IR 10 milliGRAM(s) Oral every 3 hours PRN Severe Pain (7 - 10)      Allergies  No Known Allergies    REVIEW OF SYSTEMS:  CONSTITUTIONAL: No fever, weight loss, or fatigue  EYES: No eye pain, visual disturbances, or discharge  ENMT:  No difficulty hearing, tinnitus, vertigo; No sinus or throat pain  NECK: No pain or stiffness  BREASTS: No pain, masses, or nipple discharge  RESPIRATORY: No cough, wheezing, chills or hemoptysis; No shortness of breath  CARDIOVASCULAR: No chest pain, palpitations, or lightheadedness  GASTROINTESTINAL: No abdominal or epigastric pain. No nausea, vomiting, or hematemesis; No diarrhea or constipation. No melena or hematochezia.  GENITOURINARY: No dysuria, frequency, hematuria, or incontinence  NEUROLOGICAL: No headaches, vertigo, memory loss, loss of strength, numbness, or tremors  SKIN: No itching, burning, rashes, or lesions   LYMPH NODES: No enlarged glands  ENDOCRINE: No heat or cold intolerance; No hair loss; No polydipsia or polyuria  MUSCULOSKELETAL: No back pain  PSYCHIATRIC: No depression, anxiety, or mood swings  HEME/LYMPH: No easy bruising, or bleeding gums  ALLERGY AND IMMUNOLOGIC: No hives or eczema    Vital Signs Last 24 Hrs  T(C): 36.9 (12 Oct 2023 07:49), Max: 36.9 (11 Oct 2023 23:30)  T(F): 98.4 (12 Oct 2023 07:49), Max: 98.4 (11 Oct 2023 23:30)  HR: 55 (12 Oct 2023 07:49) (46 - 59)  BP: 138/66 (12 Oct 2023 07:49) (127/43 - 179/57)  BP(mean): --  RR: 18 (12 Oct 2023 07:49) (12 - 18)  SpO2: 97% (12 Oct 2023 07:49) (96% - 100%)    Parameters below as of 12 Oct 2023 07:49  Patient On (Oxygen Delivery Method): room air        PHYSICAL EXAM:  GENERAL: NAD, well-groomed, well-developed  HEAD:  Atraumatic, Normocephalic  EYES:   conjunctiva and sclera clear  ENMT: Moist mucous membranes   NECK: Supple, No JVD   NERVOUS SYSTEM:  Alert & Oriented X3, Good concentration; Bilateral LE mobile, sensation to light touch intact  CHEST/LUNG: Clear to auscultation bilaterally;    HEART: Regular rate and rhythm;    ABDOMEN: Soft, Nontender, Nondistended; Bowel sounds present  EXTREMITIES:  2+ Peripheral Pulses, No clubbing or cyanosis  LYMPH: No lymphadenopathy noted  SKIN: No rashes or lesions  INCISION:  Dressing dry and intact    LABS:                        10.1   18.13 )-----------( 188      ( 12 Oct 2023 06:00 )             31.8     12 Oct 2023 06:00    135    |  103    |  20     ----------------------------<  157    4.5     |  25     |  0.95     Ca    8.9        12 Oct 2023 06:00        Urinalysis Basic - ( 12 Oct 2023 06:00 )    Color: x / Appearance: x / SG: x / pH: x  Gluc: 157 mg/dL / Ketone: x  / Bili: x / Urobili: x   Blood: x / Protein: x / Nitrite: x   Leuk Esterase: x / RBC: x / WBC x   Sq Epi: x / Non Sq Epi: x / Bacteria: x      CAPILLARY BLOOD GLUCOSE          RADIOLOGY & ADDITIONAL TESTS:    Imaging Personally Reviewed:      [ ] Consultant(s) Notes Reviewed  [x] Care Discussed with Consultants/Other Providers:  Ortho PA- plan of care
  Post Op Day #1    SUBJECTIVE    68yo Male status post left anterior THR, stage 1 .   Patient is alert and comfortable.    Pain is controlled with current pain regimen.  Denies nausea, vomiting, chest pain, shortness of breath, abdominal pain or fever.   No new complaints.    OBJECTIVE    Vital Signs Last 24 Hrs  T(C): 36.9 (12 Oct 2023 07:49), Max: 36.9 (11 Oct 2023 23:30)  T(F): 98.4 (12 Oct 2023 07:49), Max: 98.4 (11 Oct 2023 23:30)  HR: 55 (12 Oct 2023 07:49) (46 - 59)  BP: 138/66 (12 Oct 2023 07:49) (127/43 - 179/57)  BP(mean): --  RR: 18 (12 Oct 2023 07:49) (12 - 18)  SpO2: 97% (12 Oct 2023 07:49) (96% - 100%)    Parameters below as of 12 Oct 2023 07:49  Patient On (Oxygen Delivery Method): room air      I&O's Summary    11 Oct 2023 07:01  -  12 Oct 2023 07:00  --------------------------------------------------------  IN: 1000 mL / OUT: 410 mL / NET: 590 mL        PHYSICAL EXAM    Left hip Silverlon dressing  is clean, dry and intact.   The calf is supple/nontender.   Sensation to light touch is grossly intact distally.   Motor function distally is intact.   No foot drop.   (2+) dorsalis pedis pulse. Capillary refill is less than 2 seconds. No cyanosis.                          10.1<L>  18.13<H> )-----------( 188      ( 12 Oct 2023 06:00 )             31.8<L>  12 Oct 2023 06:00    12 Oct 2023 06:00    135    |  103    |  20     ----------------------------<  157<H>  4.5     |  25     |  0.95     Ca    8.9        12 Oct 2023 06:00        ASSESSMENT AND PLAN    - Orthopedically stable  - DVT prophylaxis: PAS + Lovenox 40mg daily  - HO Prophylaxis: Celebrex 200mg PO twice daily x21 days  - Continue physical therapy and occupational therapy  - Weight bearing as tolerated of the left lower extremity with assistance of a walker  - Incentive spirometry encouraged  - Pain control as clinically indicated  - Disposition: Home today if cleared by medicine and PT/OT       
Discharge medication calendar:  STAGED CARLIE - SIDE 1  Enoxaparin 40mg SC Qday - last dose 10/17 early AM  APAP 1000mg q8h - last dose 10/17 PM  Celecoxib 200mg q12h - last dose 10/17 AM  Omeprazole 20mg QAM - last dose 10/17 AM  Narcotic PRN  Docusate 100mg TID while taking narcotic  Miralax, Senna, or Bisacodyl PRN for treatment of constipation

## 2023-10-12 NOTE — CARE COORDINATION ASSESSMENT. - NSCAREPROVIDERS_GEN_ALL_CORE_FT
CARE PROVIDERS:  Administration: Betty Maceky  Administration: Will Lucas  Admitting: Shawanda Miranda  Attending: Shawanda Miranda  Consultant: Dayami Hopson  Nurse: Adalgisa Abreu  Nurse: Annamarie Viveros  Nurse: Lucia Mckenna  Nurse: Jennie Conn  Nurse: Akiko Nelson  Nurse: Luisa Armas  Nurse: Ana Magana  Nurse: Leatha Villarreal  Override: Lucia Mckenna  PCA/Nursing Assistant: Renee Joyner  Physical Therapy: Raiza Cifuentes  Physical Therapy: Akbar Betancur  Primary Team: Spencer Ibanez  Primary Team: Dimas Sorto  Primary Team: Makayla Ashraf  Primary Team: Rosalind Edward  Primary Team: Spencer Gutierrez  Primary Team: Amarilys Hernández  Primary Team: Tin Saez  Primary Team: Nahid Granados  Primary Team: Sarina Garcia  Quality Review: Dorinda Shell  Registered Dietitian: Guerita Partida  Respiratory Therapy: Scott Huff  Team: [facility], Team  Team: [employer] [facility], Team  UR// Supp. Assoc.: Heather Stanton

## 2023-10-12 NOTE — DISCHARGE NOTE NURSING/CASE MANAGEMENT/SOCIAL WORK - PATIENT PORTAL LINK FT
You can access the FollowMyHealth Patient Portal offered by [facility] by registering at the following website: [email or web address]. By joining [employer] [facility]s FollowMyHealth portal, you will also be able to view your health information using other applications (apps) compatible with our system.

## 2023-10-13 ENCOUNTER — TRANSCRIPTION ENCOUNTER (OUTPATIENT)
Age: 68
End: 2023-10-13

## 2023-10-18 ENCOUNTER — APPOINTMENT (OUTPATIENT)
Dept: ORTHOPEDIC SURGERY | Facility: HOSPITAL | Age: 68
End: 2023-10-18

## 2023-10-18 ENCOUNTER — INPATIENT (INPATIENT)
Facility: HOSPITAL | Age: 68
LOS: 0 days | Discharge: HOME | DRG: 470 | End: 2023-10-19
Attending: ORTHOPAEDIC SURGERY | Admitting: ORTHOPAEDIC SURGERY
Payer: COMMERCIAL

## 2023-10-18 ENCOUNTER — TRANSCRIPTION ENCOUNTER (OUTPATIENT)
Age: 68
End: 2023-10-18

## 2023-10-18 VITALS
TEMPERATURE: 98 F | HEART RATE: 58 BPM | SYSTOLIC BLOOD PRESSURE: 143 MMHG | DIASTOLIC BLOOD PRESSURE: 58 MMHG | RESPIRATION RATE: 15 BRPM | WEIGHT: 149.91 LBS | HEIGHT: 64 IN | OXYGEN SATURATION: 100 %

## 2023-10-18 DIAGNOSIS — Z98.890 OTHER SPECIFIED POSTPROCEDURAL STATES: Chronic | ICD-10-CM

## 2023-10-18 DIAGNOSIS — Z96.642 PRESENCE OF LEFT ARTIFICIAL HIP JOINT: Chronic | ICD-10-CM

## 2023-10-18 DIAGNOSIS — M16.11 UNILATERAL PRIMARY OSTEOARTHRITIS, RIGHT HIP: ICD-10-CM

## 2023-10-18 LAB
ALBUMIN SERPL ELPH-MCNC: 3.2 G/DL — LOW (ref 3.3–5)
ALBUMIN SERPL ELPH-MCNC: 3.2 G/DL — LOW (ref 3.3–5)
ALP SERPL-CCNC: 48 U/L — SIGNIFICANT CHANGE UP (ref 30–120)
ALP SERPL-CCNC: 48 U/L — SIGNIFICANT CHANGE UP (ref 30–120)
ALT FLD-CCNC: 60 U/L — SIGNIFICANT CHANGE UP (ref 10–60)
ALT FLD-CCNC: 60 U/L — SIGNIFICANT CHANGE UP (ref 10–60)
ANION GAP SERPL CALC-SCNC: 7 MMOL/L — SIGNIFICANT CHANGE UP (ref 5–17)
APTT BLD: 35.9 SEC — HIGH (ref 24.5–35.6)
APTT BLD: 35.9 SEC — HIGH (ref 24.5–35.6)
AST SERPL-CCNC: 36 U/L — SIGNIFICANT CHANGE UP (ref 10–40)
AST SERPL-CCNC: 36 U/L — SIGNIFICANT CHANGE UP (ref 10–40)
BILIRUB SERPL-MCNC: 0.4 MG/DL — SIGNIFICANT CHANGE UP (ref 0.2–1.2)
BILIRUB SERPL-MCNC: 0.4 MG/DL — SIGNIFICANT CHANGE UP (ref 0.2–1.2)
BUN SERPL-MCNC: 14 MG/DL — SIGNIFICANT CHANGE UP (ref 7–23)
BUN SERPL-MCNC: 14 MG/DL — SIGNIFICANT CHANGE UP (ref 7–23)
BUN SERPL-MCNC: 16 MG/DL — SIGNIFICANT CHANGE UP (ref 7–23)
BUN SERPL-MCNC: 16 MG/DL — SIGNIFICANT CHANGE UP (ref 7–23)
CALCIUM SERPL-MCNC: 10 MG/DL — SIGNIFICANT CHANGE UP (ref 8.4–10.5)
CALCIUM SERPL-MCNC: 10 MG/DL — SIGNIFICANT CHANGE UP (ref 8.4–10.5)
CALCIUM SERPL-MCNC: 9.7 MG/DL — SIGNIFICANT CHANGE UP (ref 8.4–10.5)
CALCIUM SERPL-MCNC: 9.7 MG/DL — SIGNIFICANT CHANGE UP (ref 8.4–10.5)
CHLORIDE SERPL-SCNC: 103 MMOL/L — SIGNIFICANT CHANGE UP (ref 96–108)
CO2 SERPL-SCNC: 26 MMOL/L — SIGNIFICANT CHANGE UP (ref 22–31)
CO2 SERPL-SCNC: 26 MMOL/L — SIGNIFICANT CHANGE UP (ref 22–31)
CO2 SERPL-SCNC: 28 MMOL/L — SIGNIFICANT CHANGE UP (ref 22–31)
CO2 SERPL-SCNC: 28 MMOL/L — SIGNIFICANT CHANGE UP (ref 22–31)
CREAT SERPL-MCNC: 1.05 MG/DL — SIGNIFICANT CHANGE UP (ref 0.5–1.3)
CREAT SERPL-MCNC: 1.05 MG/DL — SIGNIFICANT CHANGE UP (ref 0.5–1.3)
CREAT SERPL-MCNC: 1.11 MG/DL — SIGNIFICANT CHANGE UP (ref 0.5–1.3)
CREAT SERPL-MCNC: 1.11 MG/DL — SIGNIFICANT CHANGE UP (ref 0.5–1.3)
EGFR: 72 ML/MIN/1.73M2 — SIGNIFICANT CHANGE UP
EGFR: 72 ML/MIN/1.73M2 — SIGNIFICANT CHANGE UP
EGFR: 77 ML/MIN/1.73M2 — SIGNIFICANT CHANGE UP
EGFR: 77 ML/MIN/1.73M2 — SIGNIFICANT CHANGE UP
GLUCOSE SERPL-MCNC: 131 MG/DL — HIGH (ref 70–99)
GLUCOSE SERPL-MCNC: 131 MG/DL — HIGH (ref 70–99)
GLUCOSE SERPL-MCNC: 149 MG/DL — HIGH (ref 70–99)
GLUCOSE SERPL-MCNC: 149 MG/DL — HIGH (ref 70–99)
HCT VFR BLD CALC: 30.3 % — LOW (ref 39–50)
HCT VFR BLD CALC: 30.3 % — LOW (ref 39–50)
HCT VFR BLD CALC: 33.9 % — LOW (ref 39–50)
HCT VFR BLD CALC: 33.9 % — LOW (ref 39–50)
HGB BLD-MCNC: 10.4 G/DL — LOW (ref 13–17)
HGB BLD-MCNC: 10.4 G/DL — LOW (ref 13–17)
HGB BLD-MCNC: 9.6 G/DL — LOW (ref 13–17)
HGB BLD-MCNC: 9.6 G/DL — LOW (ref 13–17)
INR BLD: 0.99 RATIO — SIGNIFICANT CHANGE UP (ref 0.85–1.18)
INR BLD: 0.99 RATIO — SIGNIFICANT CHANGE UP (ref 0.85–1.18)
MCHC RBC-ENTMCNC: 27.1 PG — SIGNIFICANT CHANGE UP (ref 27–34)
MCHC RBC-ENTMCNC: 27.1 PG — SIGNIFICANT CHANGE UP (ref 27–34)
MCHC RBC-ENTMCNC: 27.4 PG — SIGNIFICANT CHANGE UP (ref 27–34)
MCHC RBC-ENTMCNC: 27.4 PG — SIGNIFICANT CHANGE UP (ref 27–34)
MCHC RBC-ENTMCNC: 30.7 GM/DL — LOW (ref 32–36)
MCHC RBC-ENTMCNC: 30.7 GM/DL — LOW (ref 32–36)
MCHC RBC-ENTMCNC: 31.7 GM/DL — LOW (ref 32–36)
MCHC RBC-ENTMCNC: 31.7 GM/DL — LOW (ref 32–36)
MCV RBC AUTO: 86.6 FL — SIGNIFICANT CHANGE UP (ref 80–100)
MCV RBC AUTO: 86.6 FL — SIGNIFICANT CHANGE UP (ref 80–100)
MCV RBC AUTO: 88.3 FL — SIGNIFICANT CHANGE UP (ref 80–100)
MCV RBC AUTO: 88.3 FL — SIGNIFICANT CHANGE UP (ref 80–100)
NRBC # BLD: 0 /100 WBCS — SIGNIFICANT CHANGE UP (ref 0–0)
PLATELET # BLD AUTO: 254 K/UL — SIGNIFICANT CHANGE UP (ref 150–400)
PLATELET # BLD AUTO: 254 K/UL — SIGNIFICANT CHANGE UP (ref 150–400)
PLATELET # BLD AUTO: 266 K/UL — SIGNIFICANT CHANGE UP (ref 150–400)
PLATELET # BLD AUTO: 266 K/UL — SIGNIFICANT CHANGE UP (ref 150–400)
POTASSIUM SERPL-MCNC: 4.1 MMOL/L — SIGNIFICANT CHANGE UP (ref 3.5–5.3)
POTASSIUM SERPL-MCNC: 4.1 MMOL/L — SIGNIFICANT CHANGE UP (ref 3.5–5.3)
POTASSIUM SERPL-MCNC: 4.9 MMOL/L — SIGNIFICANT CHANGE UP (ref 3.5–5.3)
POTASSIUM SERPL-MCNC: 4.9 MMOL/L — SIGNIFICANT CHANGE UP (ref 3.5–5.3)
POTASSIUM SERPL-SCNC: 4.1 MMOL/L — SIGNIFICANT CHANGE UP (ref 3.5–5.3)
POTASSIUM SERPL-SCNC: 4.1 MMOL/L — SIGNIFICANT CHANGE UP (ref 3.5–5.3)
POTASSIUM SERPL-SCNC: 4.9 MMOL/L — SIGNIFICANT CHANGE UP (ref 3.5–5.3)
POTASSIUM SERPL-SCNC: 4.9 MMOL/L — SIGNIFICANT CHANGE UP (ref 3.5–5.3)
PROT SERPL-MCNC: 7 G/DL — SIGNIFICANT CHANGE UP (ref 6–8.3)
PROT SERPL-MCNC: 7 G/DL — SIGNIFICANT CHANGE UP (ref 6–8.3)
PROTHROM AB SERPL-ACNC: 11.1 SEC — SIGNIFICANT CHANGE UP (ref 9.5–13)
PROTHROM AB SERPL-ACNC: 11.1 SEC — SIGNIFICANT CHANGE UP (ref 9.5–13)
RBC # BLD: 3.5 M/UL — LOW (ref 4.2–5.8)
RBC # BLD: 3.5 M/UL — LOW (ref 4.2–5.8)
RBC # BLD: 3.84 M/UL — LOW (ref 4.2–5.8)
RBC # BLD: 3.84 M/UL — LOW (ref 4.2–5.8)
RBC # FLD: 15.1 % — HIGH (ref 10.3–14.5)
SODIUM SERPL-SCNC: 136 MMOL/L — SIGNIFICANT CHANGE UP (ref 135–145)
SODIUM SERPL-SCNC: 136 MMOL/L — SIGNIFICANT CHANGE UP (ref 135–145)
SODIUM SERPL-SCNC: 138 MMOL/L — SIGNIFICANT CHANGE UP (ref 135–145)
SODIUM SERPL-SCNC: 138 MMOL/L — SIGNIFICANT CHANGE UP (ref 135–145)
WBC # BLD: 14.25 K/UL — HIGH (ref 3.8–10.5)
WBC # BLD: 14.25 K/UL — HIGH (ref 3.8–10.5)
WBC # BLD: 9.86 K/UL — SIGNIFICANT CHANGE UP (ref 3.8–10.5)
WBC # BLD: 9.86 K/UL — SIGNIFICANT CHANGE UP (ref 3.8–10.5)
WBC # FLD AUTO: 14.25 K/UL — HIGH (ref 3.8–10.5)
WBC # FLD AUTO: 14.25 K/UL — HIGH (ref 3.8–10.5)
WBC # FLD AUTO: 9.86 K/UL — SIGNIFICANT CHANGE UP (ref 3.8–10.5)
WBC # FLD AUTO: 9.86 K/UL — SIGNIFICANT CHANGE UP (ref 3.8–10.5)

## 2023-10-18 PROCEDURE — 27130 TOTAL HIP ARTHROPLASTY: CPT | Mod: 58,RT

## 2023-10-18 PROCEDURE — 99222 1ST HOSP IP/OBS MODERATE 55: CPT

## 2023-10-18 DEVICE — K-WIRE MICROAIRE (THREADED) SINGLE TROCAR 4.8MM X 9": Type: IMPLANTABLE DEVICE | Site: RIGHT | Status: FUNCTIONAL

## 2023-10-18 DEVICE — HEAD FEM OPTION CERAMIC DELTA 40MM: Type: IMPLANTABLE DEVICE | Site: RIGHT | Status: FUNCTIONAL

## 2023-10-18 DEVICE — LINER ACET EMPOWR HXA NEUT 40MM ALPHA H: Type: IMPLANTABLE DEVICE | Site: RIGHT | Status: FUNCTIONAL

## 2023-10-18 DEVICE — SCREW ACET EMPOWR 6.5X35MM: Type: IMPLANTABLE DEVICE | Site: RIGHT | Status: FUNCTIONAL

## 2023-10-18 DEVICE — BONE WAX 2.5GM: Type: IMPLANTABLE DEVICE | Site: RIGHT | Status: FUNCTIONAL

## 2023-10-18 DEVICE — CUP ACET EMPOWR CLUSTER 58MM APLHA H: Type: IMPLANTABLE DEVICE | Site: RIGHT | Status: FUNCTIONAL

## 2023-10-18 DEVICE — SCREW ACET SZ 6.5X30MM: Type: IMPLANTABLE DEVICE | Site: RIGHT | Status: FUNCTIONAL

## 2023-10-18 DEVICE — STEM BLADE EMPOWER SZ 11 LAT 132 DEG: Type: IMPLANTABLE DEVICE | Site: RIGHT | Status: FUNCTIONAL

## 2023-10-18 DEVICE — SLEEVE BIOLOX DELTA OPTION NEUTRAL: Type: IMPLANTABLE DEVICE | Site: RIGHT | Status: FUNCTIONAL

## 2023-10-18 RX ORDER — SODIUM CHLORIDE 9 MG/ML
1000 INJECTION, SOLUTION INTRAVENOUS
Refills: 0 | Status: DISCONTINUED | OUTPATIENT
Start: 2023-10-18 | End: 2023-10-19

## 2023-10-18 RX ORDER — POLYETHYLENE GLYCOL 3350 17 G/17G
17 POWDER, FOR SOLUTION ORAL AT BEDTIME
Refills: 0 | Status: DISCONTINUED | OUTPATIENT
Start: 2023-10-18 | End: 2023-10-19

## 2023-10-18 RX ORDER — OMEPRAZOLE 10 MG/1
1 CAPSULE, DELAYED RELEASE ORAL
Qty: 30 | Refills: 0
Start: 2023-10-18

## 2023-10-18 RX ORDER — FAMOTIDINE 10 MG/ML
1 INJECTION INTRAVENOUS
Refills: 0 | DISCHARGE

## 2023-10-18 RX ORDER — INFLUENZA VIRUS VACCINE 15; 15; 15; 15 UG/.5ML; UG/.5ML; UG/.5ML; UG/.5ML
0.7 SUSPENSION INTRAMUSCULAR ONCE
Refills: 0 | Status: DISCONTINUED | OUTPATIENT
Start: 2023-10-18 | End: 2023-10-19

## 2023-10-18 RX ORDER — AMLODIPINE BESYLATE 2.5 MG/1
1 TABLET ORAL
Refills: 0 | DISCHARGE

## 2023-10-18 RX ORDER — FERROUS SULFATE 325(65) MG
325 TABLET ORAL DAILY
Refills: 0 | Status: DISCONTINUED | OUTPATIENT
Start: 2023-10-18 | End: 2023-10-19

## 2023-10-18 RX ORDER — LOSARTAN POTASSIUM 100 MG/1
1 TABLET, FILM COATED ORAL
Refills: 0 | DISCHARGE

## 2023-10-18 RX ORDER — ACETAMINOPHEN 500 MG
1000 TABLET ORAL ONCE
Refills: 0 | Status: COMPLETED | OUTPATIENT
Start: 2023-10-18 | End: 2023-10-18

## 2023-10-18 RX ORDER — MAGNESIUM HYDROXIDE 400 MG/1
30 TABLET, CHEWABLE ORAL DAILY
Refills: 0 | Status: DISCONTINUED | OUTPATIENT
Start: 2023-10-18 | End: 2023-10-19

## 2023-10-18 RX ORDER — ACETAMINOPHEN 500 MG
2 TABLET ORAL
Qty: 0 | Refills: 0 | DISCHARGE
Start: 2023-10-18

## 2023-10-18 RX ORDER — CELECOXIB 200 MG/1
200 CAPSULE ORAL EVERY 12 HOURS
Refills: 0 | Status: DISCONTINUED | OUTPATIENT
Start: 2023-10-18 | End: 2023-10-19

## 2023-10-18 RX ORDER — ROSUVASTATIN CALCIUM 5 MG/1
1 TABLET ORAL
Refills: 0 | DISCHARGE

## 2023-10-18 RX ORDER — LOSARTAN POTASSIUM 100 MG/1
100 TABLET, FILM COATED ORAL DAILY
Refills: 0 | Status: DISCONTINUED | OUTPATIENT
Start: 2023-10-20 | End: 2023-10-19

## 2023-10-18 RX ORDER — APIXABAN 2.5 MG/1
2.5 TABLET, FILM COATED ORAL EVERY 12 HOURS
Refills: 0 | Status: DISCONTINUED | OUTPATIENT
Start: 2023-10-19 | End: 2023-10-19

## 2023-10-18 RX ORDER — CARVEDILOL PHOSPHATE 80 MG/1
1 CAPSULE, EXTENDED RELEASE ORAL
Refills: 0 | DISCHARGE

## 2023-10-18 RX ORDER — SENNA PLUS 8.6 MG/1
2 TABLET ORAL AT BEDTIME
Refills: 0 | Status: DISCONTINUED | OUTPATIENT
Start: 2023-10-18 | End: 2023-10-19

## 2023-10-18 RX ORDER — SODIUM CHLORIDE 9 MG/ML
500 INJECTION INTRAMUSCULAR; INTRAVENOUS; SUBCUTANEOUS ONCE
Refills: 0 | Status: COMPLETED | OUTPATIENT
Start: 2023-10-18 | End: 2023-10-18

## 2023-10-18 RX ORDER — DEXAMETHASONE 0.5 MG/5ML
8 ELIXIR ORAL ONCE
Refills: 0 | Status: COMPLETED | OUTPATIENT
Start: 2023-10-19 | End: 2023-10-19

## 2023-10-18 RX ORDER — OXYCODONE HYDROCHLORIDE 5 MG/1
10 TABLET ORAL
Refills: 0 | Status: DISCONTINUED | OUTPATIENT
Start: 2023-10-18 | End: 2023-10-19

## 2023-10-18 RX ORDER — FERROUS SULFATE 325(65) MG
1 TABLET ORAL
Refills: 0 | DISCHARGE

## 2023-10-18 RX ORDER — CARVEDILOL PHOSPHATE 80 MG/1
6.25 CAPSULE, EXTENDED RELEASE ORAL EVERY 12 HOURS
Refills: 0 | Status: DISCONTINUED | OUTPATIENT
Start: 2023-10-18 | End: 2023-10-19

## 2023-10-18 RX ORDER — ONDANSETRON 8 MG/1
4 TABLET, FILM COATED ORAL EVERY 6 HOURS
Refills: 0 | Status: DISCONTINUED | OUTPATIENT
Start: 2023-10-18 | End: 2023-10-19

## 2023-10-18 RX ORDER — APREPITANT 80 MG/1
40 CAPSULE ORAL ONCE
Refills: 0 | Status: COMPLETED | OUTPATIENT
Start: 2023-10-18 | End: 2023-10-18

## 2023-10-18 RX ORDER — CEFAZOLIN SODIUM 1 G
2000 VIAL (EA) INJECTION EVERY 8 HOURS
Refills: 0 | Status: COMPLETED | OUTPATIENT
Start: 2023-10-18 | End: 2023-10-19

## 2023-10-18 RX ORDER — PANTOPRAZOLE SODIUM 20 MG/1
40 TABLET, DELAYED RELEASE ORAL
Refills: 0 | Status: DISCONTINUED | OUTPATIENT
Start: 2023-10-18 | End: 2023-10-19

## 2023-10-18 RX ORDER — OXYCODONE HYDROCHLORIDE 5 MG/1
5 TABLET ORAL ONCE
Refills: 0 | Status: DISCONTINUED | OUTPATIENT
Start: 2023-10-18 | End: 2023-10-18

## 2023-10-18 RX ORDER — OXYCODONE HYDROCHLORIDE 5 MG/1
5 TABLET ORAL
Refills: 0 | Status: DISCONTINUED | OUTPATIENT
Start: 2023-10-18 | End: 2023-10-19

## 2023-10-18 RX ORDER — AMLODIPINE BESYLATE 2.5 MG/1
5 TABLET ORAL AT BEDTIME
Refills: 0 | Status: DISCONTINUED | OUTPATIENT
Start: 2023-10-20 | End: 2023-10-19

## 2023-10-18 RX ORDER — HYDROMORPHONE HYDROCHLORIDE 2 MG/ML
0.5 INJECTION INTRAMUSCULAR; INTRAVENOUS; SUBCUTANEOUS
Refills: 0 | Status: DISCONTINUED | OUTPATIENT
Start: 2023-10-18 | End: 2023-10-18

## 2023-10-18 RX ORDER — TRANEXAMIC ACID 100 MG/ML
1000 INJECTION, SOLUTION INTRAVENOUS ONCE
Refills: 0 | Status: COMPLETED | OUTPATIENT
Start: 2023-10-18 | End: 2023-10-18

## 2023-10-18 RX ORDER — HYDROMORPHONE HYDROCHLORIDE 2 MG/ML
0.25 INJECTION INTRAMUSCULAR; INTRAVENOUS; SUBCUTANEOUS
Refills: 0 | Status: DISCONTINUED | OUTPATIENT
Start: 2023-10-18 | End: 2023-10-18

## 2023-10-18 RX ORDER — CELECOXIB 200 MG/1
1 CAPSULE ORAL
Qty: 56 | Refills: 0
Start: 2023-10-18 | End: 2023-11-14

## 2023-10-18 RX ORDER — HYDROMORPHONE HYDROCHLORIDE 2 MG/ML
0.5 INJECTION INTRAMUSCULAR; INTRAVENOUS; SUBCUTANEOUS
Refills: 0 | Status: DISCONTINUED | OUTPATIENT
Start: 2023-10-18 | End: 2023-10-19

## 2023-10-18 RX ORDER — ATORVASTATIN CALCIUM 80 MG/1
20 TABLET, FILM COATED ORAL AT BEDTIME
Refills: 0 | Status: DISCONTINUED | OUTPATIENT
Start: 2023-10-18 | End: 2023-10-19

## 2023-10-18 RX ORDER — VANCOMYCIN HCL 1 G
1000 VIAL (EA) INTRAVENOUS ONCE
Refills: 0 | Status: COMPLETED | OUTPATIENT
Start: 2023-10-18 | End: 2023-10-18

## 2023-10-18 RX ORDER — SODIUM CHLORIDE 9 MG/ML
1000 INJECTION, SOLUTION INTRAVENOUS
Refills: 0 | Status: DISCONTINUED | OUTPATIENT
Start: 2023-10-18 | End: 2023-10-18

## 2023-10-18 RX ORDER — CEFAZOLIN SODIUM 1 G
2000 VIAL (EA) INJECTION ONCE
Refills: 0 | Status: COMPLETED | OUTPATIENT
Start: 2023-10-18 | End: 2023-10-18

## 2023-10-18 RX ORDER — APIXABAN 2.5 MG/1
1 TABLET, FILM COATED ORAL
Qty: 56 | Refills: 0
Start: 2023-10-18 | End: 2023-11-14

## 2023-10-18 RX ORDER — ACETAMINOPHEN 500 MG
1000 TABLET ORAL EVERY 8 HOURS
Refills: 0 | Status: DISCONTINUED | OUTPATIENT
Start: 2023-10-18 | End: 2023-10-19

## 2023-10-18 RX ORDER — ONDANSETRON 8 MG/1
4 TABLET, FILM COATED ORAL ONCE
Refills: 0 | Status: DISCONTINUED | OUTPATIENT
Start: 2023-10-18 | End: 2023-10-18

## 2023-10-18 RX ORDER — CHLORHEXIDINE GLUCONATE 213 G/1000ML
1 SOLUTION TOPICAL ONCE
Refills: 0 | Status: COMPLETED | OUTPATIENT
Start: 2023-10-18 | End: 2023-10-18

## 2023-10-18 RX ADMIN — CELECOXIB 200 MILLIGRAM(S): 200 CAPSULE ORAL at 21:15

## 2023-10-18 RX ADMIN — SODIUM CHLORIDE 100 MILLILITER(S): 9 INJECTION, SOLUTION INTRAVENOUS at 21:10

## 2023-10-18 RX ADMIN — Medication 250 MILLIGRAM(S): at 06:49

## 2023-10-18 RX ADMIN — Medication 1000 MILLIGRAM(S): at 21:09

## 2023-10-18 RX ADMIN — CELECOXIB 200 MILLIGRAM(S): 200 CAPSULE ORAL at 21:09

## 2023-10-18 RX ADMIN — Medication 100 MILLIGRAM(S): at 16:11

## 2023-10-18 RX ADMIN — CARVEDILOL PHOSPHATE 6.25 MILLIGRAM(S): 80 CAPSULE, EXTENDED RELEASE ORAL at 19:25

## 2023-10-18 RX ADMIN — Medication 1000 MILLIGRAM(S): at 21:15

## 2023-10-18 RX ADMIN — SODIUM CHLORIDE 75 MILLILITER(S): 9 INJECTION, SOLUTION INTRAVENOUS at 10:47

## 2023-10-18 RX ADMIN — SODIUM CHLORIDE 500 MILLILITER(S): 9 INJECTION INTRAMUSCULAR; INTRAVENOUS; SUBCUTANEOUS at 11:23

## 2023-10-18 RX ADMIN — CHLORHEXIDINE GLUCONATE 1 APPLICATION(S): 213 SOLUTION TOPICAL at 06:51

## 2023-10-18 RX ADMIN — APREPITANT 40 MILLIGRAM(S): 80 CAPSULE ORAL at 06:49

## 2023-10-18 RX ADMIN — Medication 400 MILLIGRAM(S): at 16:11

## 2023-10-18 RX ADMIN — SENNA PLUS 2 TABLET(S): 8.6 TABLET ORAL at 21:10

## 2023-10-18 RX ADMIN — ATORVASTATIN CALCIUM 20 MILLIGRAM(S): 80 TABLET, FILM COATED ORAL at 21:09

## 2023-10-18 RX ADMIN — Medication 1000 MILLIGRAM(S): at 16:41

## 2023-10-18 RX ADMIN — Medication 250 MILLIGRAM(S): at 21:09

## 2023-10-18 RX ADMIN — SODIUM CHLORIDE 500 MILLILITER(S): 9 INJECTION INTRAMUSCULAR; INTRAVENOUS; SUBCUTANEOUS at 13:10

## 2023-10-18 NOTE — PATIENT PROFILE ADULT - FUNCTIONAL ASSESSMENT - BASIC MOBILITY 5.
Please notify the patient of normal results-pap.  Follow-up as planned. 4 = No assist / stand by assistance

## 2023-10-18 NOTE — BRIEF OPERATIVE NOTE - NSICDXBRIEFPOSTOP_GEN_ALL_CORE_FT
POST-OP DIAGNOSIS:  Primary localized osteoarthritis of right hip 18-Oct-2023 10:07:04  Dimas Sorto

## 2023-10-18 NOTE — BRIEF OPERATIVE NOTE - NSICDXBRIEFPROCEDURE_GEN_ALL_CORE_FT
PROCEDURES:  Arthroplasty of right hip by anterior approach 18-Oct-2023 06:03:03 Right anterior Isai Griffith

## 2023-10-18 NOTE — DISCHARGE NOTE PROVIDER - NSDCCPTREATMENT_GEN_ALL_CORE_FT
PRINCIPAL PROCEDURE  Procedure: Arthroplasty of right hip by anterior approach  Findings and Treatment: hip osteoarthritis

## 2023-10-18 NOTE — H&P PST ADULT - NSICDXPASTMEDICALHX_GEN_ALL_CORE_FT
PAST MEDICAL HISTORY:  Constipation     COVID-19 vaccine series completed     History of COVID-19     History of eczema     History of elevated PSA     History of erectile dysfunction     History of gastritis     History of gastroesophageal reflux (GERD)     History of irregular heartbeat     History of kidney stones     History of pneumonia     History of snoring     Hyperlipidemia     Hypertension     Lumbar disc herniation     Lumbar spinal stenosis     Mild coronary artery disease     Mild emphysema     Primary osteoarthritis of left hip     Primary osteoarthritis of lumbar spine     Primary osteoarthritis of right hip     Sciatic pain, right

## 2023-10-18 NOTE — DISCHARGE NOTE NURSING/CASE MANAGEMENT/SOCIAL WORK - NSSCNAMETXT_GEN_ALL_CORE
[facility] care agency [phone] will reach out to you within 24-72 hours of your discharge to schedule home care visit/eval appointment with you. Please call agency for any queries regarding home care services

## 2023-10-18 NOTE — BRIEF OPERATIVE NOTE - NSICDXBRIEFPREOP_GEN_ALL_CORE_FT
PRE-OP DIAGNOSIS:  Primary localized osteoarthritis of right hip 18-Oct-2023 10:06:56  Dimas Sorto

## 2023-10-18 NOTE — DISCHARGE NOTE PROVIDER - NSDCCPCAREPLAN_GEN_ALL_CORE_FT
Occupational Therapy  Daily Treatment Note  Patient Name: Damon Caicedo  MRN: [id number]    Assessment: With encouragement and much effort, pt progressed to being able to stand into Zafar Handler today from raised bed. She requires Ax2 for all mobility. She is pleasant and cooperative, but feels poorly overall and needs encouragement to continue to increase activity levels. Expect continued progress with inpt rehabilitation. Discharge Recommendations: Damon Caicedo scored a 8/24 on the AM-PAC ADL Inpatient form. Current research shows that an AM-PAC score of 17 or less is typically not associated with a discharge to the patient's home setting. Based on the patient's AM-PAC score and their current ADL deficits, it is recommended that the patient have 3-5 sessions per week of Occupational Therapy at d/c to increase the patient's independence. Please see assessment section for further patient specific details. Equipment Needs:  No    Chart Reviewed: Yes   Restrictions/Precautions: Modified Diet, Up as Tolerated, Fall Risk (High fall risk, full liquid diet) Other position/activity restrictions: up wtih assist,  50% WB right hip   Additional Pertinent Hx: Adm 7/30 with weakness, nausea and abdominal pain. Recent. anterior approach CASS done 7/16,  mL, complicated procedure in the setting of patient's morbid obesity and proximal femur osteoporotic bone lesions and possibility of calcified crack which was treated with proximal femur cable. At SNF until 7/19  Discharged home with HHPT and 24 hr A from family. Transferredto ICU 8/2/22.  8/3  colonic decompression. Pt developed afib with RVR    Diagnosis: abdominal pain  Treatment Diagnosis: impaired ADLs and mobility    Subjective: Pt in bed on entry. Feeling down. \"I'm so weak and tired. How am I ever going to get stronger? \" Pt needing encouragement, but receptive.      Pain: Yes, back, bowels, hip, LEs from edema, primarily with movement    Objective:    Cognition/Orientation: alert, oriented but reports feeling her \"mind isn't right,\" requires cues for initiation, sequencing, problem solving    Bed mobility   Rolling: Max assist x2  Supine to sit: Max assist x1, mod assist x1, mod cues (HOB raised, rail used)  Scooting: Dependent (seated EOB); Max assist x2 (in chair)  Sitting: 10 min EOB, mod assist initially progressing gradually to SBA with BUE support of bed frame    Functional Mobility   Sit to Stand: Max assist x2, mod cues, set up (from raised bed to [facility], unsuccessful on first attempt; mod assist x2 from [address] with cues)  Stand to Sit: Max assist x2 (to control descent from [facility])  Bed to Chair Transfer: Dependent x2 (via [facility] max assist x2 from raised bed to recliner; will require david with nursing staff)  Static stance: 3 seconds within [facility]    UE Exercises - in supine  Elbow extension (in supported shoulder flexion): 10 reps    Activity Tolerance: Fair/Poor - benefits from encouragement    Patient Education: Activity promotion, progress, goals - verb understanding, continue to reinforce    Safety Devices in Place: left in chair, reclined with cushion to seat, pillow under LEs, alarm on, needs in reach, sister in room; plan for return to bed discussed at bedside with RN. Ceiling skylift initially not working, able to fix and ensure charging. Skylift sling placed in room + maxi move sling placed in room as well in event skylift is not fully charged for return to bed. Encouraged 1 hr sitting.  RN verb understanding      Goals:  Short Term Goals  Time Frame for Short term goals: by d/c  Short Term Goal 1: pt will complete toileting w/ Mod A - Not met  Short Term Goal 2: pt will complete LE dressing w/ Mod A - Not met  Short Term Goal 3: pt will perform BUE HEP i'ly x10 reps AROM to all joints to reduce edema and improve strength for ADLs - Not met  Short Term Goal 4: Per[facility] to [facility] to [facility] assist - Not met  Short Term Goal 5: Sit unsupported for 15 minutes while participating in ADL - Not met         Plan:      Times per Week: 2-5   Times per Day: Daily    If patient is discharged prior to next treatment, this note will serve as the discharge summary.     Therapy Time   Individual Concurrent Group Co-treatment   Time In 2643         Time Out 1550         Minutes 68          Timed Code Treatment Minutes: 68  Total Treatment Time: [facility],  PRINCIPAL DISCHARGE DIAGNOSIS  Diagnosis: Primary osteoarthritis of right hip  Assessment and Plan of Treatment: You have had an Anterior Total Hip Replacement. Follow instructions given to you by your surgeon.   PT/OT Total Hip Protocol; Ambulation, transfers, stairs, & ADLs  Full weight bearing both legs; Walker/cane use as instructed by PT/OT  Anterior THR precautions for 4 weeks: No straight leg raise; No external rotation of hip when extended-standing or lying flat; No hyperextension of hip when standing (kickback)  • Ice 30 minutes several times daily with at least a 60 minute break in between icing sessions  Daily dressing changes if incision is not completely dry.  If inicision is dry, it may be left open to air.  Keep incision clean. DO NOT APPLY ANYTHING to incision site (salves/ointments/creams).  May shower post-op if no drainage from incision.  Do not scrub incision site. Pat dry after shower.  Prineo tape/Suture removal on/near after Post Op Day # 14.  See PCP in office approximately 2-3 weeks from discharge for exam/labwork.       PRINCIPAL DISCHARGE DIAGNOSIS  Diagnosis: Primary osteoarthritis of right hip  Assessment and Plan of Treatment: You have had an Anterior Total Hip Replacement. Follow instructions given to you by your surgeon.   PT/OT Total Hip Protocol; Ambulation, transfers, stairs, & ADLs  Full weight bearing both legs; Walker/cane use as instructed by PT/OT  Anterior THR precautions for 4 weeks: No straight leg raise; No external rotation of hip when extended-standing or lying flat; No hyperextension of hip when standing (kickback)  • Ice 30 minutes several times daily with at least a 60 minute break in between icing sessions  Daily dressing changes if incision is not completely dry.  If inicision is dry, it may be left open to air.  Keep incision clean. DO NOT APPLY ANYTHING to incision site (salves/ointments/creams).  May shower post-op if no drainage from incision.  Do not scrub incision site. Pat dry after shower.  See PCP in office approximately 2-3 weeks from discharge for exam/labwork.  You have a HUY dressing. You may shower. Disconnect HUY battery prior to showering. Reconnect battery after showering and press orange button to resume HUY power. Remove HUY dressing on post-op day #7.  Keep incision clean. DO NOT APPLY ANYTHING to incision site (salves/ointments/creams). Do not scrub incision site. Pat dry after shower.  Suture/Prineo removal 2 weeks after surgery at Surgeon's office.       PRINCIPAL DISCHARGE DIAGNOSIS  Diagnosis: Primary osteoarthritis of right hip  Assessment and Plan of Treatment: Your new total hip replacement requires proper care.  Your surgical care provider is your best resource for information.  Physical Therapy/Occupational Therapy for: Ambulation, transfers, stairs, & ADLs, isometrics.  Full weight bearing on both legs; Walker/cane use as instructed by Physical therapy/Occupational therapy.  Anterior Total Hip Replacement precautions for  6 weeks:  - No straight leg raise;  - No external rotation of hip when extended-standing or lying flat; No hyperextension of hip when standing (kickback).  - Use pain medication if needed as prescribed.  Ice packs are a helpful addition to your comfort.  Applying a  waterproof ice 20 minutes on alternating with 20 minutes off for 48 hours post op pack over a cloth or thin towel to the site for 30 minutes per hour may provide benefit by reducing swelling and discomfort.  - Keep incision clean and dry.   -Take short, frequent walks increasing the distance that you walk each day as tolerated.  - Change your position every hour to decrease pain and stiffness.  - Continue the exercises taught to you by your physical therapist.  - No driving until cleared by the doctor.  - No tub baths, hot tubs, or swimming pools until instructed by your doctor.  Silverlon dressing is waterproof.  You may shower, but do not aim shower stream at surgical site. Pat dry after shower.   Remove Silverlon dressing on postop day #7.  suture removal on/near after Post Op Day # 14 in your Surgeon's office.

## 2023-10-18 NOTE — DISCHARGE NOTE NURSING/CASE MANAGEMENT/SOCIAL WORK - NSDCFUADDAPPT_GEN_ALL_CORE_FT
It is advisable to follow up with your primary care provider within the next 2-3 weeks to ensure your medications are appropriate and there are no underlying problems after your procedure.    Please call Dr. Miranda's office to confirm your first postoperative appointment.

## 2023-10-18 NOTE — DISCHARGE NOTE PROVIDER - NSDCFUSCHEDAPPT_GEN_ALL_CORE_FT
[facility]  ORTHOSURG [address]  Scheduled Appointment: 11/03/2023    Shawanda Miranda  [facility]  ORTHOSURG [address]  Scheduled Appointment: 12/22/2023     Talya Redding  [facility]  ORTHOSURG [address]  Scheduled Appointment: 10/26/2023    [facility]  ORTHOSURG [address]  Scheduled Appointment: 11/03/2023    Shawanda Miranda  [facility]  ORTHOSURG [address]  Scheduled Appointment: 12/22/2023

## 2023-10-18 NOTE — DISCHARGE NOTE NURSING/CASE MANAGEMENT/SOCIAL WORK - PATIENT PORTAL LINK FT
You can access the FollowMyHealth Patient Portal offered by [facility] by registering at the following website: [email or web address]. By joining [employer]’s FollowMyHealth portal, you will also be able to view your health information using other applications (apps) compatible with our system.

## 2023-10-18 NOTE — H&P PST ADULT - NSANTHOBSERVEDRD_ENT_A_CORE
Telephone Encounter by Vamshi Fountain MD at 12/08/17 05:23 PM     Author:  Vamshi Fountain MD Service:  (none) Author Type:  Physician     Filed:  12/08/17 05:24 PM Encounter Date:  12/8/2017 Status:  Signed     :  Vamshi Fountain MD (Physician)            Patient has appointment in December to be seen  Therefore can refill ×1 , done[TK1.1M]      Revision History        User Key Date/Time User Provider Type Action    > TK1.1 12/08/17 05:24 PM Vamshi Fountain MD Physician Sign    M - Manual             No

## 2023-10-18 NOTE — DISCHARGE NOTE PROVIDER - CARE PROVIDER_API CALL
Shawanda Miranda  Orthopaedic Surgery  [address], [address]  [place] [address]  Phone: [phone]  Fax: [phone]  Follow Up Time: 2 weeks   Shawanda Miranda  Orthopaedic Surgery  [address], [address]  [place] [address]  Phone: [phone]  Fax: [phone]  Scheduled Appointment: 10/26/2023 11:00 AM

## 2023-10-18 NOTE — PHYSICAL THERAPY INITIAL EVALUATION ADULT - FUNCTIONAL LIMITATIONS, PT EVAL
[facility] [address]  [place] [address]               Yoli Akers   3/17/2017  2:18 AM   ED    Description:  Female : 2016   Department:  [facility]           Your Care was Coordinated By:     Provider Role From To    Derrick Camacho III, MD Attending Provider 17 0221 --      Reason for Visit     Fever           Diagnoses this Visit        Comments    Fever           ED Disposition     None           To Do List           Follow-up Information     Follow up with Anna Marx MD In 3 days.    Specialty:  Infectious Diseases    Contact information:    [address] [place] [address]  [address]  [place] [address]  [phone]        [facility]     [facility] Care [facility] -  Assistance  Registered nurses in the [facility] provide clinical advisement, health education, appointment booking, and other advisory services.  Call for this free service at [phone].             Medications           Message regarding Medications     Verify the changes and/or additions to your medication regime listed below are the same as discussed with your clinician today.  If any of these changes or additions are incorrect, please notify your healthcare provider.        These medications were administered today        Dose Freq    ibuprofen 100 mg/5 mL suspension 112 mg 10 mg/kg × 11.2 kg ED 1 Time    Sig: Take 5.6 mLs (112 mg total) by mouth ED 1 Time.    Class: Normal    Route: Oral           Verify that the below list of medications is an accurate representation of the medications you are currently taking.  If none reported, the list may be blank. If incorrect, please contact your healthcare provider. Carry this list with you in case of emergency.           Current Medications     cefdinir (OMNICEF) 125 mg/5 mL suspension Take 3 mLs (75 mg total) by mouth 2 (two) times daily.           Clinical Reference Information           Your  Vitals Were     Pulse Temp Resp Weight SpO2       157 102.1 °F (38.9 °C) 22 11.2 kg (24 lb 11.1 oz) 100%       Allergies as of 3/17/2017        Reactions    Eggs [Egg Derived] Anaphylaxis      Immunizations Administered on Date of Encounter - 3/17/2017     None      ED Micro, Lab, POCT     Start Ordered       Status Ordering Provider    03/17/17 0235 03/17/17 0234  Blood culture  STAT      In process     03/17/17 0235 03/17/17 0234  Basic metabolic panel  STAT      Final result     03/17/17 0235 03/17/17 0234  CBC auto differential  STAT      Final result       ED Imaging Orders     Start Ordered       Status Ordering Provider    03/17/17 0235 03/17/17 0234  X-Ray Chest PA And Lateral  1 time imaging      In process         Discharge Instructions         Kid Care: Fever    A fever is a natural reaction of the body to an illness, such as infections from a virus or bacteria. In most cases, the fever itself is not harmful. It actually helps the body fight infections. A fever does not need to be treated unless your child is uncomfortable and looks or acts sick. How your child looks and feels are often more important than the level of the fever.  If your child has a fever, check his or her temperature as needed. Do not use a glass thermometer that contains mercury. They can be dangerous if the glass breaks and the mercury spills out. A digital thermometer is a good alternative. The way you use it will depend on your child's age. Ask your childs healthcare provider for more information about how to use a thermometer on your child. General guidelines are:  · The American Academy of Pediatrics advises that for children less than 3 years, rectal temperatures are most accurate. Since infants must be immediately evaluated by a healthcare provider if they have a fever, accuracy is very important.   · For toddlers, take an axillary temperature (under the armpit).  · For children old enough to hold a thermometer in the mouth  (usually around 4 or 5 years of age), take an oral temperature (in the mouth).  · For children 6 months and older, you can use an ear thermometer. This is also called a tympanic membrane thermometer.  · A temporal artery thermometer may be used in babies and children of any age. This is a better way to screen for fever than an axillary (armpit) temperature.  Comfort care for fevers  If your child has a fever, here are some things you can do to help him or her feel better:  · Give fluids to replace those lost through sweating with fever. Water is best, but low-sodium broths or soups, diluted fruit juice, or frozen juice bars can be used for older children. Talk with your healthcare provider about a plan. For an infant, breastmilk or formula is fine and all that is usually needed.  · If your child has discomfort from the fever, check with your healthcare provider to see if you can use ibuprofen or acetaminophen to help reduce the fever. (Never give aspirin to a child under age 18. It could cause a rare but serious condition called Reye syndrome.) Generally, ibuprofen is not recommended for infants younger than 6 months. The correct dose for these medicines depends on your child's weight.   · Make sure your child gets lots of rest.  · Dress your child lightly and change clothes often if he or she sweats a lot. Use only enough covers on the bed for your child to be comfortable.  Facts about fevers  Fever facts include the following:  · Exercise, eating, excitement, and hot or cold drinks can all affect your childs temperature.  · A childs reaction to fever can vary. Your child may feel fine with a high fever, or feel miserable with a slight fever.  · If your child is active and alert, and is eating and drinking, there is no need to give fever medicine.  · Temperatures are naturally lower in the morning (4 to 8 a.m.) and higher in the early evening (4 to 6 p.m.).  When to call your child's healthcare provider  Call the  healthcare providers office if your otherwise healthy child has any of the signs or symptoms below:  · A rectal temperature of 100.4°F (38°C) or higher in an infant younger than 3 months  · A temperature that rises to 104°F (40°C) or higher in a child of any age  · A fever that lasts more than 24 hours in a child younger than 2 years or for 3 days in a child 2 years or older  · A seizure caused by the fever  · Rapid breathing or shortness of breath  · A stiff neck or headache  · Difficulty swallowing  · Signs of dehydration. These include severe thirst, dark yellow urine, infrequent urination, dull or sunken eyes, dry skin, and dry or cracked lips  · Your child still doesnt look right to you, even after taking a nonaspirin pain reliever   Date Last Reviewed: 8/1/2016  © 1498-3934 "[employer]. [address] [place] [address]. All rights reserved. This information is not intended as a substitute for professional medical care. Always follow your healthcare professional's instructions.          Discharge References/Attachments     FEBRILE ILLNESS, UNCERTAIN CAUSE (CHILD) (ENGLISH)       [facility] complies with applicable Federal civil rights laws and does not discriminate on the basis of race, color, national origin, age, disability, or sex.        Language Assistance Services     ATTENTION: Language assistance services are available, free of charge. Please call [phone].      ATENCIÓN: Si habla español, tiene a garza disposición servicios gratuitos de asistencia lingüística. Llame al [phone].     CHÚ Ý: N?u b?n nói Ti?ng Vi?t, có các d?ch v? h? tr? ngôn ng? mi?n phí dành cho b?n. G?i s? [phone].         self-care/home management/community/leisure

## 2023-10-18 NOTE — DISCHARGE NOTE PROVIDER - NSDCFUADDAPPT_GEN_ALL_CORE_FT
It is advisable to follow up with your primary care provider within the next 2-3 weeks to ensure your medications are appropriate and there are no underlying problems after your procedure.   It is advisable to follow up with your primary care provider within the next 2-3 weeks to ensure your medications are appropriate and there are no underlying problems after your procedure.    Please call Dr. iMranda's office to confirm your first postoperative appointment.

## 2023-10-18 NOTE — H&P PST ADULT - MUSCULOSKELETAL
bilateral hips/no joint swelling/no joint erythema/no joint warmth/no calf tenderness/decreased ROM/decreased ROM due to pain/strength 5/5 bilateral upper extremities/decreased strength/abnormal gait details…

## 2023-10-18 NOTE — PHYSICAL THERAPY INITIAL EVALUATION ADULT - PERTINENT HX OF CURRENT PROBLEM, REHAB EVAL
67M presents with 5 year history of bilateral hip pain. He was treated in the past with cortisone injections with temporary relief. Pain now 3/10 at rest and increased to 8-10/10 when standing from a sitting position and with prolonged activity. Pain radiates to the knee. Pain is relieved with diclofenac.   s/p left THR 10/11 and now s/p right THR    Currently comfortable in bed.  minimal pain.

## 2023-10-18 NOTE — H&P PST ADULT - HISTORY OF PRESENT ILLNESS
68 yo male presents with 5 year history of bilateral hip pain. He was treated in the past with cortisone injections with temporary relief. Pain now 3/10 at rest and increased to 8-10/10 when standing from a sitting position and with prolonged activity. Pain radiates to the knee. Pain is relieved with diclofenac. Scheduled for left THR on 10/11/23 and right THR on 10/18/23.

## 2023-10-18 NOTE — H&P PST ADULT - MS GEN HX ROS MEA POS PC
No further orders or interventions placed. Continuous monitoring in progress. Call bell within reach and safety maintained. arthralgia/arthritis/joint pain/stiffness

## 2023-10-18 NOTE — DISCHARGE NOTE PROVIDER - PROVIDER TOKENS
PROVIDER:[TOKEN:[[id number]],FOLLOWUP:[2 weeks]] PROVIDER:[TOKEN:[[id number]],SCHEDULEDAPPT:[10/26/2023],SCHEDULEDAPPTTIME:[11:00 AM]]

## 2023-10-18 NOTE — DISCHARGE NOTE PROVIDER - NSDCMRMEDTOKEN_GEN_ALL_CORE_FT
acetaminophen 500 mg oral tablet: 2 tab(s) orally every 8 hours  amLODIPine 5 mg oral tablet: 1 tab(s) orally once a day (at bedtime)  carvedilol 6.25 mg oral tablet: 1 tab(s) orally 2 times a day  CeleBREX 200 mg oral capsule: 1 cap(s) orally every 12 hours  famotidine 20 mg oral tablet: 1 tab(s) orally 2 times a day  ferrous sulfate 325 mg (65 mg elemental iron) oral tablet: 1 tab(s) orally 2 times a week  losartan 100 mg oral tablet: 1 tab(s) orally once a day (at bedtime)  omeprazole 20 mg oral delayed release capsule: 1 cap(s) orally once a day  oxyCODONE 5 mg oral tablet: 1 tab(s) orally every 4 hours as needed for  moderate pain May take 2 tabs for severe pain MDD: 6  rosuvastatin 5 mg oral tablet: 1 tab(s) orally once a day (at bedtime)   acetaminophen 500 mg oral tablet: 2 tab(s) orally every 8 hours  amLODIPine 5 mg oral tablet: 1 tab(s) orally once a day (at bedtime)  carvedilol 6.25 mg oral tablet: 1 tab(s) orally 2 times a day  CeleBREX 200 mg oral capsule: 1 cap(s) orally every 12 hours  Eliquis 2.5 mg oral tablet: 1 tab(s) orally every 12 hours for four weeks  famotidine 20 mg oral tablet: 1 tab(s) orally 2 times a day  ferrous sulfate 325 mg (65 mg elemental iron) oral tablet: 1 tab(s) orally 2 times a week  losartan 100 mg oral tablet: 1 tab(s) orally once a day (at bedtime)  omeprazole 20 mg oral delayed release capsule: 1 cap(s) orally once a day  rosuvastatin 5 mg oral tablet: 1 tab(s) orally once a day (at bedtime)   acetaminophen 500 mg oral tablet: 2 tab(s) orally every 8 hours  amLODIPine 5 mg oral tablet: 1 tab(s) orally once a day (at bedtime)  carvedilol 6.25 mg oral tablet: 1 tab(s) orally 2 times a day  CeleBREX 200 mg oral capsule: 1 cap(s) orally every 12 hours  Eliquis 2.5 mg oral tablet: 1 tab(s) orally every 12 hours for four weeks  famotidine 20 mg oral tablet: 1 tab(s) orally 2 times a day  ferrous sulfate 325 mg (65 mg elemental iron) oral tablet: 1 tab(s) orally 2 times a week  losartan 100 mg oral tablet: 1 tab(s) orally once a day (at bedtime)  omeprazole 20 mg oral delayed release capsule: 1 cap(s) orally once a day  oxyCODONE 5 mg oral tablet: orally every 4 hours as needed for  severe pain  rosuvastatin 5 mg oral tablet: 1 tab(s) orally once a day (at bedtime)

## 2023-10-18 NOTE — DISCHARGE NOTE NURSING/CASE MANAGEMENT/SOCIAL WORK - NSDCPEFALRISK_GEN_ALL_CORE
For information on Fall & Injury Prevention, visit: [email or web address] OR  [email or web address] OR  https://www.cdc.gov/steadi/patient.html

## 2023-10-18 NOTE — PHYSICAL THERAPY INITIAL EVALUATION ADULT - NS ASR RISK AREAS PT EVAL
yes Consent: Written consent obtained. Risks include but not limited to bruising, beading, irregular texture, ulceration, infection, allergic reaction, scar formation, incomplete augmentation, temporary nature, procedural pain. fall/impaired judgment/safety awareness

## 2023-10-18 NOTE — H&P PST ADULT - LAST CARDIAC ANGIOGRAM/IMAGING
Physical Therapy Discharge Summary    Name: Adilia Rosas  MRN: [id number]   Principal Problem: Fracture of femur     Patient Discharged from acute Physical Therapy on 18.  Please refer to prior PT noted date on 18 for functional status.     Assessment:     Patient appropriate for care in another setting.    Objective:     GOALS:   Multidisciplinary Problems     Physical Therapy Goals        Problem: Physical Therapy Goal    Goal Priority Disciplines Outcome Goal Variances Interventions   Physical Therapy Goal     PT, PT/OT Ongoing (interventions implemented as appropriate)     Description:  Goals to be met by: 2018     Patient will increase functional independence with mobility by performin. Supine to sit with Modified [place]  2. Sit to stand transfer with Stand-by Assistance  3. Bed to chair transfer with Stand-by Assistance using Rolling Walker  4. Gait  x 50 feet with Stand-by Assistance using Rolling Walker.                       Reasons for Discontinuation of Therapy Services  Transfer to alternate level of care.      Plan:     Patient Discharged to: Skilled Nursing Facility.    Cora Daniels, PT  2018   20 years ago

## 2023-10-18 NOTE — DISCHARGE NOTE PROVIDER - HOSPITAL COURSE
This patient was admitted to [facility] with a history of severe degenerative joint disease of the right hip.  Patient underwent Pre-Surgical Testing and was medically cleared to undergo elective procedure. Patient underwent right anterior total hip replacement by Dr. Miranda. Procedure was well tolerated.  No operative or tyshawn-operative complications arose during patient's hospital course.  Patient received antibiotic according to SCIP guidelines for infection prevention. Eliquis 2.5 mg Q12h was given for DVT prophylaxis, in addition to the use of SCDs.  Anesthesia, Medical Hospitalist, Physical Therapy and Occupational Therapy were consulted. Patient is stable for discharge with a good prognosis.  Appropriate discharge instructions and medications are provided in this document.   This patient was admitted to [facility] with a history of severe degenerative joint disease of the right hip.  Patient underwent Pre-Surgical Testing and was medically cleared to undergo elective procedure. Patient underwent right anterior total hip replacement by Dr. Miranda. Procedure was well tolerated.  No operative or tyshawn-operative complications arose during patient's hospital course.  Patient received antibiotic according to SCIP guidelines for infection prevention. Eliquis 2.5 mg Q12h was given for DVT prophylaxis, in addition to the use of SCDs.  Anesthesia, Medical Hospitalist, Physical Therapy and Occupational Therapy were consulted. Patient is stable for discharge with a good prognosis.  Appropriate discharge instructions and medications are provided in this document. Patient is going home with home care (PT/OT/Skilled Nursing)

## 2023-10-18 NOTE — CONSULT NOTE ADULT - ASSESSMENT
67M s/p RightTHR    OA Right hip s/p THR  s/p left THR 10/11  Pain Management: acceptable- continue current care Tylenol ATC/Celebrex ATC/ Oxycodone PRN  Continue PT/OT  DVT proph: [  ] high risk - Eliquis   DC plan:  [x  ] Home with HC     GERD/ Gastritis  continue PPI    CAD/ HTN/ HLD  Continue Coreg, Amlodipine, Losartan and Norvasc with hold parameters  Continue Statin    BPH  continue Tadalfil  monitor voiding

## 2023-10-18 NOTE — CONSULT NOTE ADULT - SUBJECTIVE AND OBJECTIVE BOX
Patient is a 67y old  Male who presents with a chief complaint of "my hips both hurt" (18 Oct 2023 06:02)      HPI: 67M presents with 5 year history of bilateral hip pain. He was treated in the past with cortisone injections with temporary relief. Pain now 3/10 at rest and increased to 8-10/10 when standing from a sitting position and with prolonged activity. Pain radiates to the knee. Pain is relieved with diclofenac.   s/p left THR 10/11 and now s/p right THR    Currently comfortable in bed.  minimal pain.       REVIEW OF SYSTEMS:  CONSTITUTIONAL: No fever, weight loss, or fatigue  EYES: No eye pain, visual disturbances, or discharge  ENMT:  No difficulty hearing, tinnitus, vertigo; No sinus or throat pain  NECK: No pain or stiffness  BREASTS: No pain, masses, or nipple discharge  RESPIRATORY: No cough, wheezing, chills or hemoptysis; No shortness of breath  CARDIOVASCULAR: No chest pain, palpitations, dizziness, or leg swelling  GASTROINTESTINAL: No abdominal or epigastric pain. No nausea, vomiting, or hematemesis; No diarrhea or constipation. No melena or hematochezia.  GENITOURINARY: No dysuria, frequency, hematuria, or incontinence  NEUROLOGICAL: No headaches, memory loss, loss of strength, numbness, or tremors  SKIN: No itching, burning, rashes, or lesions   LYMPH NODES: No enlarged glands  ENDOCRINE: No heat or cold intolerance; No hair loss  MUSCULOSKELETAL: No muscle or back pain  PSYCHIATRIC: No depression, anxiety, mood swings, or difficulty sleeping  HEME/LYMPH: No easy bruising, or bleeding gums  ALLERGY AND IMMUNOLOGIC: No hives or eczema    PAST MEDICAL & SURGICAL HISTORY:  Hyperlipidemia    Hypertension    Mild coronary artery disease    History of irregular heartbeat    History of pneumonia    History of eczema    History of COVID-19    COVID-19 vaccine series completed    History of erectile dysfunction    History of gastritis    History of gastroesophageal reflux (GERD)    Mild emphysema    History of snoring    Constipation    History of kidney stones    History of elevated PSA    Lumbar spinal stenosis    Lumbar disc herniation    Primary osteoarthritis of lumbar spine    Primary osteoarthritis of left hip    Primary osteoarthritis of right hip    Sciatic pain, right    History of prostate biopsy    History of lung biopsy    S/P total left hip arthroplasty  10/11/23          SOCIAL HISTORY:  Residence: [ ] [facility]  [ ] SNF  [x ] Community  [ ] Substance abuse: denies  [ ] Tobacco: 1ppd  (not ready to discuss quitting)  [ ] Alcohol use: occasional glass of wine    Allergies  No Known Allergies      MEDICATIONS  (STANDING):  acetaminophen     Tablet .. 1000 milliGRAM(s) Oral every 8 hours  acetaminophen   IVPB .. 1000 milliGRAM(s) IV Intermittent once  atorvastatin 20 milliGRAM(s) Oral at bedtime  carvedilol 6.25 milliGRAM(s) Oral every 12 hours  ceFAZolin   IVPB 2000 milliGRAM(s) IV Intermittent every 8 hours  celecoxib 200 milliGRAM(s) Oral every 12 hours  ferrous    sulfate 325 milliGRAM(s) Oral daily  influenza  Vaccine (HIGH DOSE) 0.7 milliLiter(s) IntraMuscular once  lactated ringers. 1000 milliLiter(s) (100 mL/Hr) IV Continuous <Continuous>  pantoprazole    Tablet 40 milliGRAM(s) Oral before breakfast  polyethylene glycol 3350 17 Gram(s) Oral at bedtime  senna 2 Tablet(s) Oral at bedtime  sodium chloride 0.9% Bolus 500 milliLiter(s) IV Bolus once  vancomycin  IVPB 1000 milliGRAM(s) IV Intermittent once    MEDICATIONS  (PRN):  HYDROmorphone  Injectable 0.5 milliGRAM(s) IV Push every 3 hours PRN Breakthrough pain  magnesium hydroxide Suspension 30 milliLiter(s) Oral daily PRN Constipation  ondansetron Injectable 4 milliGRAM(s) IV Push every 6 hours PRN Nausea and/or Vomiting  oxyCODONE    IR 5 milliGRAM(s) Oral every 3 hours PRN Moderate Pain (4 - 6)  oxyCODONE    IR 10 milliGRAM(s) Oral every 3 hours PRN Severe Pain (7 - 10)      FAMILY HISTORY:  Family history of heart attack (Mother)    Family history of stroke (Mother)    Family history of lung cancer (Mother)    Family history of emphysema (Mother)    Family history of hyperlipidemia (Mother, Sibling)    FHx: coronary artery disease (Father)    Family history of esophageal cancer (Father)    Family history of hypertension (Father)    Family history of type 2 diabetes mellitus (Father, Sibling)        Vital Signs Last 24 Hrs  T(C): 36.7 (18 Oct 2023 11:45), Max: 36.9 (18 Oct 2023 09:57)  T(F): 98 (18 Oct 2023 11:45), Max: 98.4 (18 Oct 2023 09:57)  HR: 45 (18 Oct 2023 11:45) (43 - 58)  BP: 136/52 (18 Oct 2023 11:45) (127/50 - 176/72)  BP(mean): --  RR: 13 (18 Oct 2023 11:45) (10 - 16)  SpO2: 98% (18 Oct 2023 11:45) (95% - 100%)    Parameters below as of 18 Oct 2023 11:45  Patient On (Oxygen Delivery Method): room air        PHYSICAL EXAM:    GENERAL: NAD   HEAD:  Atraumatic, Normocephalic  EYES:  conjunctiva and sclera clear  ENMT:   Moist mucous membranes   NECK: Supple, No JVD   NERVOUS SYSTEM:  Alert & Oriented X3, Good concentration; Moving all 4 extremities; No gross sensory deficits  CHEST/LUNG: Clear to auscultation bilaterally;    HEART: Regular rate and rhythm;    ABDOMEN: Soft, Nontender, Nondistended; Bowel sounds present  EXTREMITIES:  2+ Peripheral Pulses, No clubbing, cyanosis, or edema  LYMPH: No lymphadenopathy noted  /RECTAL: Not examined  BREAST: Not examined  SKIN: No rashes or lesions  INCISION: dressing dry and intact    LABS:                        9.6    9.86  )-----------( 266      ( 18 Oct 2023 06:52 )             30.3     10-18    138  |  103  |  16  ----------------------------<  131<H>  4.1   |  28  |  1.11    Ca    10.0      18 Oct 2023 06:52    TPro  7.0  /  Alb  3.2<L>  /  TBili  0.4  /  DBili  x   /  AST  36  /  ALT  60  /  AlkPhos  48  10-18    PT/INR - ( 18 Oct 2023 06:52 )   PT: 11.1 sec;   INR: 0.99 ratio         PTT - ( 18 Oct 2023 06:52 )  PTT:35.9 sec  Urinalysis Basic - ( 18 Oct 2023 06:52 )    Color: x / Appearance: x / SG: x / pH: x  Gluc: 131 mg/dL / Ketone: x  / Bili: x / Urobili: x   Blood: x / Protein: x / Nitrite: x   Leuk Esterase: x / RBC: x / WBC x   Sq Epi: x / Non Sq Epi: x / Bacteria: x      CAPILLARY BLOOD GLUCOSE          RADIOLOGY & ADDITIONAL STUDIES:    EKG:   Personally Reviewed:  [ ] YES     Imaging: intraop fluoro reviewed  Personally Reviewed:  [ ] YES     Consultant(s) Notes Reviewed:      Care Discussed with Consultants/Other Providers:

## 2023-10-18 NOTE — H&P PST ADULT - NSANTHOSAYNRD_GEN_A_CORE
neck 18 inches/No. ANDRES screening performed.  STOP BANG Legend: 0-2 = LOW Risk; 3-4 = INTERMEDIATE Risk; 5-8 = HIGH Risk

## 2023-10-18 NOTE — H&P PST ADULT - ATTENDING PHYSICIAN: I HAVE REVIEWED THE CLINICAL DOCUMENTATION AND AGREE WITH THE ABOVE NOTE
[facility] and [address],  [facility], Critical access hospital [id number]  793 [facility],5Th Floor   9 [facility], [address] [place] [address]  Phone: [phone]  Fax: [phone]       Occupational Therapy  Cancellation/No-show Note  Patient Name:  Jet Juarez  :  1977   Date:  10/10/2022    Cancelled visits to date: 0  No-shows to date: 0    For today's appointment patient:  [x]  Cancelled  []  Rescheduled appointment  []  No-show     Reason given by patient:  []  Patient ill  []  Conflicting appointment  []  No transportation    []  Conflict with work  []  No reason given  []  Other:     Comments:      Phone call information:   []  Phone call made today to patient at _ time at number provided:      []  Patient answered, conversation as follows:    []  Patient did not answer, message left as follows:  []  Phone call not made today  [x]  Phone call not needed - pt contacted us to cancel and provided reason for cancellation.      Electronically signed by:  Cristhian Veliz, OT, OTR/L, CHT
Statement Selected

## 2023-10-19 VITALS
DIASTOLIC BLOOD PRESSURE: 71 MMHG | HEART RATE: 62 BPM | SYSTOLIC BLOOD PRESSURE: 113 MMHG | OXYGEN SATURATION: 98 % | RESPIRATION RATE: 16 BRPM | TEMPERATURE: 98 F

## 2023-10-19 LAB
ANION GAP SERPL CALC-SCNC: 9 MMOL/L — SIGNIFICANT CHANGE UP (ref 5–17)
ANION GAP SERPL CALC-SCNC: 9 MMOL/L — SIGNIFICANT CHANGE UP (ref 5–17)
BUN SERPL-MCNC: 16 MG/DL — SIGNIFICANT CHANGE UP (ref 7–23)
BUN SERPL-MCNC: 16 MG/DL — SIGNIFICANT CHANGE UP (ref 7–23)
CALCIUM SERPL-MCNC: 8.8 MG/DL — SIGNIFICANT CHANGE UP (ref 8.4–10.5)
CALCIUM SERPL-MCNC: 8.8 MG/DL — SIGNIFICANT CHANGE UP (ref 8.4–10.5)
CHLORIDE SERPL-SCNC: 102 MMOL/L — SIGNIFICANT CHANGE UP (ref 96–108)
CHLORIDE SERPL-SCNC: 102 MMOL/L — SIGNIFICANT CHANGE UP (ref 96–108)
CO2 SERPL-SCNC: 24 MMOL/L — SIGNIFICANT CHANGE UP (ref 22–31)
CO2 SERPL-SCNC: 24 MMOL/L — SIGNIFICANT CHANGE UP (ref 22–31)
CREAT SERPL-MCNC: 1.08 MG/DL — SIGNIFICANT CHANGE UP (ref 0.5–1.3)
CREAT SERPL-MCNC: 1.08 MG/DL — SIGNIFICANT CHANGE UP (ref 0.5–1.3)
EGFR: 75 ML/MIN/1.73M2 — SIGNIFICANT CHANGE UP
EGFR: 75 ML/MIN/1.73M2 — SIGNIFICANT CHANGE UP
GLUCOSE SERPL-MCNC: 183 MG/DL — HIGH (ref 70–99)
GLUCOSE SERPL-MCNC: 183 MG/DL — HIGH (ref 70–99)
HCT VFR BLD CALC: 22.8 % — LOW (ref 39–50)
HCT VFR BLD CALC: 22.8 % — LOW (ref 39–50)
HGB BLD-MCNC: 7.2 G/DL — LOW (ref 13–17)
HGB BLD-MCNC: 7.2 G/DL — LOW (ref 13–17)
MCHC RBC-ENTMCNC: 27 PG — SIGNIFICANT CHANGE UP (ref 27–34)
MCHC RBC-ENTMCNC: 27 PG — SIGNIFICANT CHANGE UP (ref 27–34)
MCHC RBC-ENTMCNC: 31.6 GM/DL — LOW (ref 32–36)
MCHC RBC-ENTMCNC: 31.6 GM/DL — LOW (ref 32–36)
MCV RBC AUTO: 85.4 FL — SIGNIFICANT CHANGE UP (ref 80–100)
MCV RBC AUTO: 85.4 FL — SIGNIFICANT CHANGE UP (ref 80–100)
NRBC # BLD: 0 /100 WBCS — SIGNIFICANT CHANGE UP (ref 0–0)
NRBC # BLD: 0 /100 WBCS — SIGNIFICANT CHANGE UP (ref 0–0)
PLATELET # BLD AUTO: 212 K/UL — SIGNIFICANT CHANGE UP (ref 150–400)
PLATELET # BLD AUTO: 212 K/UL — SIGNIFICANT CHANGE UP (ref 150–400)
POTASSIUM SERPL-MCNC: 4.4 MMOL/L — SIGNIFICANT CHANGE UP (ref 3.5–5.3)
POTASSIUM SERPL-MCNC: 4.4 MMOL/L — SIGNIFICANT CHANGE UP (ref 3.5–5.3)
POTASSIUM SERPL-SCNC: 4.4 MMOL/L — SIGNIFICANT CHANGE UP (ref 3.5–5.3)
POTASSIUM SERPL-SCNC: 4.4 MMOL/L — SIGNIFICANT CHANGE UP (ref 3.5–5.3)
RBC # BLD: 2.67 M/UL — LOW (ref 4.2–5.8)
RBC # BLD: 2.67 M/UL — LOW (ref 4.2–5.8)
RBC # FLD: 15 % — HIGH (ref 10.3–14.5)
RBC # FLD: 15 % — HIGH (ref 10.3–14.5)
SODIUM SERPL-SCNC: 135 MMOL/L — SIGNIFICANT CHANGE UP (ref 135–145)
SODIUM SERPL-SCNC: 135 MMOL/L — SIGNIFICANT CHANGE UP (ref 135–145)
WBC # BLD: 16.26 K/UL — HIGH (ref 3.8–10.5)
WBC # BLD: 16.26 K/UL — HIGH (ref 3.8–10.5)
WBC # FLD AUTO: 16.26 K/UL — HIGH (ref 3.8–10.5)
WBC # FLD AUTO: 16.26 K/UL — HIGH (ref 3.8–10.5)

## 2023-10-19 PROCEDURE — 99232 SBSQ HOSP IP/OBS MODERATE 35: CPT

## 2023-10-19 RX ORDER — OXYCODONE HYDROCHLORIDE 5 MG/1
0 TABLET ORAL
Qty: 0 | Refills: 0 | DISCHARGE
Start: 2023-10-19

## 2023-10-19 RX ADMIN — Medication 1000 MILLIGRAM(S): at 05:54

## 2023-10-19 RX ADMIN — APIXABAN 2.5 MILLIGRAM(S): 2.5 TABLET, FILM COATED ORAL at 10:00

## 2023-10-19 RX ADMIN — PANTOPRAZOLE SODIUM 40 MILLIGRAM(S): 20 TABLET, DELAYED RELEASE ORAL at 05:55

## 2023-10-19 RX ADMIN — CELECOXIB 200 MILLIGRAM(S): 200 CAPSULE ORAL at 10:00

## 2023-10-19 RX ADMIN — CARVEDILOL PHOSPHATE 6.25 MILLIGRAM(S): 80 CAPSULE, EXTENDED RELEASE ORAL at 05:55

## 2023-10-19 RX ADMIN — Medication 1000 MILLIGRAM(S): at 14:08

## 2023-10-19 RX ADMIN — Medication 1000 MILLIGRAM(S): at 06:27

## 2023-10-19 RX ADMIN — Medication 325 MILLIGRAM(S): at 11:18

## 2023-10-19 RX ADMIN — Medication 100 MILLIGRAM(S): at 00:24

## 2023-10-19 RX ADMIN — Medication 101.6 MILLIGRAM(S): at 05:55

## 2023-10-19 NOTE — PROGRESS NOTE ADULT - SUBJECTIVE AND OBJECTIVE BOX
Discharge medication calendar:  Eliquis 2.5mg q12h x 4 weeks  Omeprazole 20mg QAM x 4 weeks  APAP 1000mg q8h x 2-3 weeks  Celecoxib 200mg q12h x 2-3 weeks  Narcotic PRN  Docusate 100mg TID while taking narcotic  Miralax, Senna, or Bisacodyl PRN for treatment of constipation  
Patient is a 67y old  Male who presents with a chief complaint of "my hips both hurt" (18 Oct 2023 12:50)      INTERVAL HPI/OVERNIGHT EVENTS:  feels great, wants to go home  does know what anemia feels like - had a GI bleed several years ago and does not feel bad at all.  was able to exercise with PT and go to they GYM and still feels well.       MEDICATIONS  (STANDING):  acetaminophen     Tablet .. 1000 milliGRAM(s) Oral every 8 hours  apixaban 2.5 milliGRAM(s) Oral every 12 hours  atorvastatin 20 milliGRAM(s) Oral at bedtime  carvedilol 6.25 milliGRAM(s) Oral every 12 hours  celecoxib 200 milliGRAM(s) Oral every 12 hours  ferrous    sulfate 325 milliGRAM(s) Oral daily  influenza  Vaccine (HIGH DOSE) 0.7 milliLiter(s) IntraMuscular once  lactated ringers. 1000 milliLiter(s) (100 mL/Hr) IV Continuous <Continuous>  pantoprazole    Tablet 40 milliGRAM(s) Oral before breakfast  polyethylene glycol 3350 17 Gram(s) Oral at bedtime  senna 2 Tablet(s) Oral at bedtime    MEDICATIONS  (PRN):  HYDROmorphone  Injectable 0.5 milliGRAM(s) IV Push every 3 hours PRN Breakthrough pain  magnesium hydroxide Suspension 30 milliLiter(s) Oral daily PRN Constipation  ondansetron Injectable 4 milliGRAM(s) IV Push every 6 hours PRN Nausea and/or Vomiting  oxyCODONE    IR 5 milliGRAM(s) Oral every 3 hours PRN Moderate Pain (4 - 6)  oxyCODONE    IR 10 milliGRAM(s) Oral every 3 hours PRN Severe Pain (7 - 10)      Allergies    No Known Allergies    Intolerances        REVIEW OF SYSTEMS:  CONSTITUTIONAL: No fever, weight loss, or fatigue  EYES: No eye pain, visual disturbances, or discharge  ENMT:  No difficulty hearing, tinnitus, vertigo; No sinus or throat pain  NECK: No pain or stiffness  BREASTS: No pain, masses, or nipple discharge  RESPIRATORY: No cough, wheezing, chills or hemoptysis; No shortness of breath  CARDIOVASCULAR: No chest pain, palpitations, or lightheadedness  GASTROINTESTINAL: No abdominal or epigastric pain. No nausea, vomiting, or hematemesis; No diarrhea or constipation. No melena or hematochezia.  GENITOURINARY: No dysuria, frequency, hematuria, or incontinence  NEUROLOGICAL: No headaches, vertigo, memory loss, loss of strength, numbness, or tremors  SKIN: No itching, burning, rashes, or lesions   LYMPH NODES: No enlarged glands  ENDOCRINE: No heat or cold intolerance; No hair loss; No polydipsia or polyuria  MUSCULOSKELETAL: No back pain  PSYCHIATRIC: No depression, anxiety, or mood swings  HEME/LYMPH: No easy bruising, or bleeding gums  ALLERGY AND IMMUNOLOGIC: No hives or eczema    Vital Signs Last 24 Hrs  T(C): 36.6 (19 Oct 2023 07:19), Max: 36.8 (18 Oct 2023 15:00)  T(F): 97.8 (19 Oct 2023 07:19), Max: 98.2 (18 Oct 2023 15:00)  HR: 62 (19 Oct 2023 07:19) (56 - 73)  BP: 113/71 (19 Oct 2023 07:19) (113/71 - 147/82)  BP(mean): --  RR: 16 (19 Oct 2023 07:19) (16 - 16)  SpO2: 98% (19 Oct 2023 07:19) (95% - 100%)    Parameters below as of 19 Oct 2023 07:19  Patient On (Oxygen Delivery Method): room air        PHYSICAL EXAM:  GENERAL: NAD, well-groomed, well-developed  HEAD:  Atraumatic, Normocephalic  EYES:  conjunctiva and sclera clear  ENMT: Moist mucous membranes   NECK: Supple, No JVD   NERVOUS SYSTEM:  Alert & Oriented X3, Good concentration; Bilateral LE mobile, sensation to light touch intact  CHEST/LUNG: Clear to auscultation bilaterally;    HEART: Regular rate and rhythm;   ABDOMEN: Soft, Nontender, Nondistended; Bowel sounds present  EXTREMITIES:  2+ Peripheral Pulses, No clubbing or cyanosis  LYMPH: No lymphadenopathy noted  SKIN: No rashes or lesions  INCISION:  Dressing dry and intact    LABS:                        7.2    16.26 )-----------( 212      ( 19 Oct 2023 06:00 )             22.8     19 Oct 2023 06:00    135    |  102    |  16     ----------------------------<  183    4.4     |  24     |  1.08     Ca    8.8        19 Oct 2023 06:00      PT/INR - ( 18 Oct 2023 06:52 )   PT: 11.1 sec;   INR: 0.99 ratio         PTT - ( 18 Oct 2023 06:52 )  PTT:35.9 sec  Urinalysis Basic - ( 19 Oct 2023 06:00 )    Color: x / Appearance: x / SG: x / pH: x  Gluc: 183 mg/dL / Ketone: x  / Bili: x / Urobili: x   Blood: x / Protein: x / Nitrite: x   Leuk Esterase: x / RBC: x / WBC x   Sq Epi: x / Non Sq Epi: x / Bacteria: x      CAPILLARY BLOOD GLUCOSE          RADIOLOGY & ADDITIONAL TESTS:    Imaging Personally Reviewed:      [ ] Consultant(s) Notes Reviewed  [x] Care Discussed with Consultants/Other Providers:  Ortho PA- plan of care
POST OPERATIVE DAY #: 1 Right Anterior THR   POD#8 Left Anterior THR    67y Male  s/p  Left  /Right   Anterior THR                        SUBJECTIVE: Patient seen and examined at bedside.     Pain:  well controlled   Pain scale:   4/10  Denies CP, SOB, N/V/D, weakness, numbness   No new complains     OBJECTIVE:     Vital Signs Last 24 Hrs  T(C): 36.6 (19 Oct 2023 07:19), Max: 36.9 (18 Oct 2023 09:57)  T(F): 97.8 (19 Oct 2023 07:19), Max: 98.4 (18 Oct 2023 09:57)  HR: 62 (19 Oct 2023 07:19) (43 - 73)  BP: 113/71 (19 Oct 2023 07:19) (113/71 - 147/82)  BP(mean): --  RR: 16 (19 Oct 2023 07:19) (10 - 16)  SpO2: 98% (19 Oct 2023 07:19) (95% - 100%)    Parameters below as of 19 Oct 2023 07:19  Patient On (Oxygen Delivery Method): room air        Affected extremity: RLE/ LLE         Dressing: Right Hip:, silverlon, clean/dry/intact             Left Hip: + prineo, clean, dry, intact                 Sensation: intact to light touch          Motor exam:   5/ 5 Tibialis Anterior/Gastrocnemius-Soleus, EHL/FHL         warm, well-perfused; capillary refill < 3 seconds         negative calf tenderness B/L LE    LABS:                        10.4   14.25 )-----------( 254      ( 18 Oct 2023 12:32 )             33.9     10-19    135  |  102  |  16  ----------------------------<  183<H>  4.4   |  24  |  1.08    Ca    8.8      19 Oct 2023 06:00    TPro  7.0  /  Alb  3.2<L>  /  TBili  0.4  /  DBili  x   /  AST  36  /  ALT  60  /  AlkPhos  48  10-18      I&O's Detail    18 Oct 2023 07:01  -  19 Oct 2023 07:00  --------------------------------------------------------  IN:    Lactated Ringers: 1400 mL    Sodium Chloride 0.9% Bolus: 500 mL  Total IN: 1900 mL    OUT:    Blood Loss (mL): 150 mL    Voided (mL): 1300 mL  Total OUT: 1450 mL    Total NET: 450 mL      MEDICATIONS:    Pain management:  acetaminophen     Tablet .. 1000 milliGRAM(s) Oral every 8 hours  celecoxib 200 milliGRAM(s) Oral every 12 hours  HYDROmorphone  Injectable 0.5 milliGRAM(s) IV Push every 3 hours PRN  ondansetron Injectable 4 milliGRAM(s) IV Push every 6 hours PRN  oxyCODONE    IR 10 milliGRAM(s) Oral every 3 hours PRN  oxyCODONE    IR 5 milliGRAM(s) Oral every 3 hours PRN    DVT prophylaxis:   apixaban 2.5 milliGRAM(s) Oral every 12 hours    RADIOLOGY & ADDITIONAL STUDIES:    ASSESSMENT AND PLAN:     - Analgesic pain control  - DVT prophylaxis: Eliquis2.5mg twice a day      SCDs       - HO prophylaxis/Pain Management: Celebrex 200mg twice a day x 21 days    - PT/OT: Weight Bearing Status:  Weight bearing as tolerated, OOBTC           Anterior Hip precautions  -  Incentive spirometry  - ice  - Advance diet as tolerated  - Hospitalist is following  -  Follow up labs  -  Disposition: Home today pending PT/OT/medicine clearance 
Post Op Note- POD #0    SUBJECTIVE    68yo Male status post right THR.  Patient is alert and comfortable  Pain is well controlled with.   Denies chest pain/shortness of breath/nausea/vomiting.     OBJECTIVE    T(C): 36.7 (10-18-23 @ 11:45), Max: 36.9 (10-18-23 @ 09:57)  HR: 45 (10-18-23 @ 11:45) (43 - 58)  BP: 136/52 (10-18-23 @ 11:45) (127/50 - 176/72)  RR: 13 (10-18-23 @ 11:45) (10 - 16)  SpO2: 98% (10-18-23 @ 11:45) (95% - 100%)  Wt(kg): --      10-18 @ 07:01  -  10-18 @ 14:31  --------------------------------------------------------  IN: 1900 mL / OUT: 150 mL / NET: 1750 mL      PHYSICAL EXAM    Right hip primary silverlon dressing C/D/I  Sensation Intact to Light Touch distally  EHL/FHL intact  Toes warm and pink, capillary refill <2 sec.   Calves soft/nontender bilaterally       ASSESSMENT AND PLAN    - Orthopedically stable  - DVT prophylaxis: PAS + Eliquis 2.5 mg x 4 weeks  - HO prophylaxis: Celebrex 200mg PO twice daily  - OOB as tolerated with PT/OT  - Hip dislocation precautions  - Pain control as clinically indicated  - Medicine to follow   - Continue antibiotics as per SCIP protocol  - Follow up Labs  - Incentive spirometry  - Discharge home tomorrow, pending progress with PT

## 2023-10-19 NOTE — PROGRESS NOTE ADULT - ASSESSMENT
67M s/p RightTHR    OA Right hip s/p THR  s/p left THR 10/11  Pain Management: acceptable- continue current care Tylenol ATC/Celebrex ATC/ Oxycodone PRN  Continue PT/OT  DVT proph: [  ] high risk - Eliquis   DC plan:  [x  ] Home with HC   post operative blood loss anemia.  pt with no signs or symptoms of anemia.  Does not want transfusion at this time.  Discussed with him that if he should develop symptoms to come to the ED or call 911.     GERD/ Gastritis  continue PPI    CAD/ HTN/ HLD  Continue Coreg, Amlodipine, Losartan and Norvasc with hold parameters  Continue Statin    BPH  continue Tadalfil  monitor voiding

## 2023-10-20 ENCOUNTER — TRANSCRIPTION ENCOUNTER (OUTPATIENT)
Age: 68
End: 2023-10-20

## 2023-10-21 ENCOUNTER — TRANSCRIPTION ENCOUNTER (OUTPATIENT)
Age: 68
End: 2023-10-21

## 2023-10-23 ENCOUNTER — TRANSCRIPTION ENCOUNTER (OUTPATIENT)
Age: 68
End: 2023-10-23

## 2023-10-26 ENCOUNTER — APPOINTMENT (OUTPATIENT)
Dept: ORTHOPEDIC SURGERY | Facility: CLINIC | Age: 68
End: 2023-10-26
Payer: MEDICARE

## 2023-10-26 VITALS — DIASTOLIC BLOOD PRESSURE: 66 MMHG | HEART RATE: 66 BPM | TEMPERATURE: 98.8 F | SYSTOLIC BLOOD PRESSURE: 112 MMHG

## 2023-10-26 DIAGNOSIS — M62.838 OTHER MUSCLE SPASM: ICD-10-CM

## 2023-10-26 PROCEDURE — 73521 X-RAY EXAM HIPS BI 2 VIEWS: CPT

## 2023-10-26 PROCEDURE — 99024 POSTOP FOLLOW-UP VISIT: CPT

## 2023-10-26 RX ORDER — TIZANIDINE 2 MG/1
2 TABLET ORAL
Qty: 15 | Refills: 0 | Status: ACTIVE | COMMUNITY
Start: 2023-10-26 | End: 1900-01-01

## 2023-10-26 RX ORDER — APIXABAN 5 MG/1
TABLET, FILM COATED ORAL
Refills: 0 | Status: ACTIVE | COMMUNITY

## 2023-10-26 RX ORDER — CELECOXIB 200 MG/1
200 CAPSULE ORAL
Refills: 0 | Status: ACTIVE | COMMUNITY

## 2023-10-26 RX ORDER — AMOXICILLIN 500 MG/1
500 CAPSULE ORAL
Qty: 12 | Refills: 2 | Status: ACTIVE | COMMUNITY
Start: 2023-10-26 | End: 1900-01-01

## 2023-10-26 RX ORDER — ACETAMINOPHEN 500 MG/1
500 TABLET, COATED ORAL
Refills: 0 | Status: ACTIVE | COMMUNITY

## 2023-10-30 RX ORDER — ZOLPIDEM TARTRATE 5 MG/1
5 TABLET ORAL
Qty: 14 | Refills: 0 | Status: ACTIVE | COMMUNITY
Start: 2023-10-30 | End: 1900-01-01

## 2023-10-31 ENCOUNTER — TRANSCRIPTION ENCOUNTER (OUTPATIENT)
Age: 68
End: 2023-10-31

## 2023-11-03 ENCOUNTER — APPOINTMENT (OUTPATIENT)
Dept: ORTHOPEDIC SURGERY | Facility: CLINIC | Age: 68
End: 2023-11-03

## 2023-11-09 ENCOUNTER — TRANSCRIPTION ENCOUNTER (OUTPATIENT)
Age: 68
End: 2023-11-09

## 2023-11-09 ENCOUNTER — NON-APPOINTMENT (OUTPATIENT)
Age: 68
End: 2023-11-09

## 2023-11-10 ENCOUNTER — EMERGENCY (EMERGENCY)
Facility: HOSPITAL | Age: 68
LOS: 1 days | Discharge: HOME | End: 2023-11-10
Attending: EMERGENCY MEDICINE | Admitting: EMERGENCY MEDICINE
Payer: MEDICARE

## 2023-11-10 VITALS
SYSTOLIC BLOOD PRESSURE: 151 MMHG | OXYGEN SATURATION: 100 % | WEIGHT: 156.53 LBS | HEART RATE: 60 BPM | RESPIRATION RATE: 15 BRPM | TEMPERATURE: 98 F | HEIGHT: 64 IN | DIASTOLIC BLOOD PRESSURE: 70 MMHG

## 2023-11-10 VITALS
RESPIRATION RATE: 18 BRPM | TEMPERATURE: 98 F | DIASTOLIC BLOOD PRESSURE: 59 MMHG | HEART RATE: 61 BPM | OXYGEN SATURATION: 99 % | SYSTOLIC BLOOD PRESSURE: 154 MMHG

## 2023-11-10 DIAGNOSIS — Z96.642 PRESENCE OF LEFT ARTIFICIAL HIP JOINT: Chronic | ICD-10-CM

## 2023-11-10 DIAGNOSIS — Z98.890 OTHER SPECIFIED POSTPROCEDURAL STATES: Chronic | ICD-10-CM

## 2023-11-10 LAB
ALBUMIN SERPL ELPH-MCNC: 3.2 G/DL — LOW (ref 3.3–5)
ALBUMIN SERPL ELPH-MCNC: 3.2 G/DL — LOW (ref 3.3–5)
ALP SERPL-CCNC: 64 U/L — SIGNIFICANT CHANGE UP (ref 30–120)
ALP SERPL-CCNC: 64 U/L — SIGNIFICANT CHANGE UP (ref 30–120)
ALT FLD-CCNC: 23 U/L — SIGNIFICANT CHANGE UP (ref 10–60)
ALT FLD-CCNC: 23 U/L — SIGNIFICANT CHANGE UP (ref 10–60)
ANION GAP SERPL CALC-SCNC: 7 MMOL/L — SIGNIFICANT CHANGE UP (ref 5–17)
ANION GAP SERPL CALC-SCNC: 7 MMOL/L — SIGNIFICANT CHANGE UP (ref 5–17)
APTT BLD: 27.9 SEC — SIGNIFICANT CHANGE UP (ref 24.5–35.6)
APTT BLD: 27.9 SEC — SIGNIFICANT CHANGE UP (ref 24.5–35.6)
AST SERPL-CCNC: 16 U/L — SIGNIFICANT CHANGE UP (ref 10–40)
AST SERPL-CCNC: 16 U/L — SIGNIFICANT CHANGE UP (ref 10–40)
BASOPHILS # BLD AUTO: 0.02 K/UL — SIGNIFICANT CHANGE UP (ref 0–0.2)
BASOPHILS # BLD AUTO: 0.02 K/UL — SIGNIFICANT CHANGE UP (ref 0–0.2)
BASOPHILS NFR BLD AUTO: 0.2 % — SIGNIFICANT CHANGE UP (ref 0–2)
BASOPHILS NFR BLD AUTO: 0.2 % — SIGNIFICANT CHANGE UP (ref 0–2)
BILIRUB SERPL-MCNC: 0.2 MG/DL — SIGNIFICANT CHANGE UP (ref 0.2–1.2)
BILIRUB SERPL-MCNC: 0.2 MG/DL — SIGNIFICANT CHANGE UP (ref 0.2–1.2)
BLD GP AB SCN SERPL QL: SIGNIFICANT CHANGE UP
BLD GP AB SCN SERPL QL: SIGNIFICANT CHANGE UP
BUN SERPL-MCNC: 26 MG/DL — HIGH (ref 7–23)
BUN SERPL-MCNC: 26 MG/DL — HIGH (ref 7–23)
CALCIUM SERPL-MCNC: 9.4 MG/DL — SIGNIFICANT CHANGE UP (ref 8.4–10.5)
CALCIUM SERPL-MCNC: 9.4 MG/DL — SIGNIFICANT CHANGE UP (ref 8.4–10.5)
CHLORIDE SERPL-SCNC: 104 MMOL/L — SIGNIFICANT CHANGE UP (ref 96–108)
CHLORIDE SERPL-SCNC: 104 MMOL/L — SIGNIFICANT CHANGE UP (ref 96–108)
CO2 SERPL-SCNC: 26 MMOL/L — SIGNIFICANT CHANGE UP (ref 22–31)
CO2 SERPL-SCNC: 26 MMOL/L — SIGNIFICANT CHANGE UP (ref 22–31)
CREAT SERPL-MCNC: 1.09 MG/DL — SIGNIFICANT CHANGE UP (ref 0.5–1.3)
CREAT SERPL-MCNC: 1.09 MG/DL — SIGNIFICANT CHANGE UP (ref 0.5–1.3)
EGFR: 74 ML/MIN/1.73M2 — SIGNIFICANT CHANGE UP
EGFR: 74 ML/MIN/1.73M2 — SIGNIFICANT CHANGE UP
EOSINOPHIL # BLD AUTO: 0.42 K/UL — SIGNIFICANT CHANGE UP (ref 0–0.5)
EOSINOPHIL # BLD AUTO: 0.42 K/UL — SIGNIFICANT CHANGE UP (ref 0–0.5)
EOSINOPHIL NFR BLD AUTO: 4.6 % — SIGNIFICANT CHANGE UP (ref 0–6)
EOSINOPHIL NFR BLD AUTO: 4.6 % — SIGNIFICANT CHANGE UP (ref 0–6)
GLUCOSE SERPL-MCNC: 99 MG/DL — SIGNIFICANT CHANGE UP (ref 70–99)
GLUCOSE SERPL-MCNC: 99 MG/DL — SIGNIFICANT CHANGE UP (ref 70–99)
HCT VFR BLD CALC: 27.9 % — LOW (ref 39–50)
HCT VFR BLD CALC: 27.9 % — LOW (ref 39–50)
HGB BLD-MCNC: 7.9 G/DL — LOW (ref 13–17)
HGB BLD-MCNC: 7.9 G/DL — LOW (ref 13–17)
IMM GRANULOCYTES NFR BLD AUTO: 1.3 % — HIGH (ref 0–0.9)
IMM GRANULOCYTES NFR BLD AUTO: 1.3 % — HIGH (ref 0–0.9)
INR BLD: 1.25 RATIO — HIGH (ref 0.85–1.18)
INR BLD: 1.25 RATIO — HIGH (ref 0.85–1.18)
LYMPHOCYTES # BLD AUTO: 1.41 K/UL — SIGNIFICANT CHANGE UP (ref 1–3.3)
LYMPHOCYTES # BLD AUTO: 1.41 K/UL — SIGNIFICANT CHANGE UP (ref 1–3.3)
LYMPHOCYTES # BLD AUTO: 15.5 % — SIGNIFICANT CHANGE UP (ref 13–44)
LYMPHOCYTES # BLD AUTO: 15.5 % — SIGNIFICANT CHANGE UP (ref 13–44)
MCHC RBC-ENTMCNC: 25.6 PG — LOW (ref 27–34)
MCHC RBC-ENTMCNC: 25.6 PG — LOW (ref 27–34)
MCHC RBC-ENTMCNC: 28.3 GM/DL — LOW (ref 32–36)
MCHC RBC-ENTMCNC: 28.3 GM/DL — LOW (ref 32–36)
MCV RBC AUTO: 90.6 FL — SIGNIFICANT CHANGE UP (ref 80–100)
MCV RBC AUTO: 90.6 FL — SIGNIFICANT CHANGE UP (ref 80–100)
MONOCYTES # BLD AUTO: 1.04 K/UL — HIGH (ref 0–0.9)
MONOCYTES # BLD AUTO: 1.04 K/UL — HIGH (ref 0–0.9)
MONOCYTES NFR BLD AUTO: 11.4 % — SIGNIFICANT CHANGE UP (ref 2–14)
MONOCYTES NFR BLD AUTO: 11.4 % — SIGNIFICANT CHANGE UP (ref 2–14)
NEUTROPHILS # BLD AUTO: 6.11 K/UL — SIGNIFICANT CHANGE UP (ref 1.8–7.4)
NEUTROPHILS # BLD AUTO: 6.11 K/UL — SIGNIFICANT CHANGE UP (ref 1.8–7.4)
NEUTROPHILS NFR BLD AUTO: 67 % — SIGNIFICANT CHANGE UP (ref 43–77)
NEUTROPHILS NFR BLD AUTO: 67 % — SIGNIFICANT CHANGE UP (ref 43–77)
NRBC # BLD: 0 /100 WBCS — SIGNIFICANT CHANGE UP (ref 0–0)
NRBC # BLD: 0 /100 WBCS — SIGNIFICANT CHANGE UP (ref 0–0)
PLATELET # BLD AUTO: 261 K/UL — SIGNIFICANT CHANGE UP (ref 150–400)
PLATELET # BLD AUTO: 261 K/UL — SIGNIFICANT CHANGE UP (ref 150–400)
POTASSIUM SERPL-MCNC: 5 MMOL/L — SIGNIFICANT CHANGE UP (ref 3.5–5.3)
POTASSIUM SERPL-MCNC: 5 MMOL/L — SIGNIFICANT CHANGE UP (ref 3.5–5.3)
POTASSIUM SERPL-SCNC: 5 MMOL/L — SIGNIFICANT CHANGE UP (ref 3.5–5.3)
POTASSIUM SERPL-SCNC: 5 MMOL/L — SIGNIFICANT CHANGE UP (ref 3.5–5.3)
PROT SERPL-MCNC: 6.8 G/DL — SIGNIFICANT CHANGE UP (ref 6–8.3)
PROT SERPL-MCNC: 6.8 G/DL — SIGNIFICANT CHANGE UP (ref 6–8.3)
PROTHROM AB SERPL-ACNC: 13.5 SEC — HIGH (ref 9.5–13)
PROTHROM AB SERPL-ACNC: 13.5 SEC — HIGH (ref 9.5–13)
RBC # BLD: 3.08 M/UL — LOW (ref 4.2–5.8)
RBC # BLD: 3.08 M/UL — LOW (ref 4.2–5.8)
RBC # FLD: 16.6 % — HIGH (ref 10.3–14.5)
RBC # FLD: 16.6 % — HIGH (ref 10.3–14.5)
SODIUM SERPL-SCNC: 137 MMOL/L — SIGNIFICANT CHANGE UP (ref 135–145)
SODIUM SERPL-SCNC: 137 MMOL/L — SIGNIFICANT CHANGE UP (ref 135–145)
WBC # BLD: 9.12 K/UL — SIGNIFICANT CHANGE UP (ref 3.8–10.5)
WBC # BLD: 9.12 K/UL — SIGNIFICANT CHANGE UP (ref 3.8–10.5)
WBC # FLD AUTO: 9.12 K/UL — SIGNIFICANT CHANGE UP (ref 3.8–10.5)
WBC # FLD AUTO: 9.12 K/UL — SIGNIFICANT CHANGE UP (ref 3.8–10.5)

## 2023-11-10 PROCEDURE — 85610 PROTHROMBIN TIME: CPT

## 2023-11-10 PROCEDURE — 86901 BLOOD TYPING SEROLOGIC RH(D): CPT

## 2023-11-10 PROCEDURE — 93010 ELECTROCARDIOGRAM REPORT: CPT

## 2023-11-10 PROCEDURE — 93005 ELECTROCARDIOGRAM TRACING: CPT

## 2023-11-10 PROCEDURE — 80053 COMPREHEN METABOLIC PANEL: CPT

## 2023-11-10 PROCEDURE — 86850 RBC ANTIBODY SCREEN: CPT

## 2023-11-10 PROCEDURE — 86900 BLOOD TYPING SEROLOGIC ABO: CPT

## 2023-11-10 PROCEDURE — 85025 COMPLETE CBC W/AUTO DIFF WBC: CPT

## 2023-11-10 PROCEDURE — 36415 COLL VENOUS BLD VENIPUNCTURE: CPT

## 2023-11-10 PROCEDURE — 99283 EMERGENCY DEPT VISIT LOW MDM: CPT | Mod: 25

## 2023-11-10 PROCEDURE — 85730 THROMBOPLASTIN TIME PARTIAL: CPT

## 2023-11-10 PROCEDURE — 99284 EMERGENCY DEPT VISIT MOD MDM: CPT | Mod: FS

## 2023-11-10 NOTE — ED ADULT NURSE REASSESSMENT NOTE - NS ED NURSE REASSESS COMMENT FT1
patient sleeping at this time easy to arouse no pain no difficulty breathing when awake , continue to monitor

## 2023-11-10 NOTE — ED ADULT NURSE REASSESSMENT NOTE - NS ED NURSE REASSESS COMMENT FT1
patient A&Ox3 steady gait in no acute distress no chest pain no difficulty breathing , no dizziness at this time , patient discharge as orders , heplock remove, left ER self ambulated

## 2023-11-10 NOTE — ED PROVIDER NOTE - OBJECTIVE STATEMENT
67-year-old male with history of arthritis, chronic low back pain, history of kidney stones, COPD, GERD, gastritis, hypertension, hyperlipidemia presents with low hemoglobin and mild shortness of breath with exertion.  Patient had left hip replacement on October 11 and right hip replacement on October 18 by Dr. Clancy.  At discharge on October 18, had a hemoglobin of 7.2.  Was discussed at that time about whether or not patient would have a transfusion.  Per patient, Decided at that time to not have a transfusion and hemoglobin would likely improve.  Had blood work rechecked 10 days ago by primary care doctor and was 7.5.  Patient has had continued mild shortness of breath with exertion since October 18 after the surgery.  No weakness or shortness of breath at rest.  No chest pain.  States shortness of breath is mild.  States he was able to perform physical therapy recently without difficulty.  States he "wanted to make sure his hemoglobin was not dropping".  Is on Eliquis daily and was told he would be on it for 1 month after the surgery.  Denies any current weakness or dizziness.  Denies any blood in stools.  States incisions are healing well.  PCP Nicolás Miranda

## 2023-11-10 NOTE — ED PROVIDER NOTE - PROGRESS NOTE DETAILS
Patient stable.  No complaints at this time.  Hemoglobin today 7.9.  Hemoglobin at discharge on October 18 was 7.2, hemoglobin 7.510 days ago, and today 7.9.  Hemoglobin trending upward.  Discussed option of transfusion and patient declined since hemoglobin is improving.  Copies of all labs provided to patient.  Recommend close follow-up with primary care doctor to have hemoglobin rechecked in 1 week.  Return precautions explained.

## 2023-11-10 NOTE — ED ADULT TRIAGE NOTE - CHIEF COMPLAINT QUOTE
I had right hip replacement on10/18/23, discharged with low number of Hgb from hospital. I have had sob and lightheadedness, last time I checked it was 7.5

## 2023-11-10 NOTE — ED ADULT NURSE NOTE - NSFALLUNIVINTERV_ED_ALL_ED
Bed/Stretcher in lowest position, wheels locked, appropriate side rails in place/Call bell, personal items and telephone in reach/Instruct patient to call for assistance before getting out of bed/chair/stretcher/Non-slip footwear applied when patient is off stretcher/[place] to call system/Physically safe environment - no spills, clutter or unnecessary equipment/Purposeful proactive rounding/Room/bathroom lighting operational, light cord in reach

## 2023-11-10 NOTE — ED ADULT NURSE NOTE - OBJECTIVE STATEMENT
patient A&Ox3 in no acute distress c/o of mild difficulty breathing when walking " some distance" , patient denied  chest pain no difficulty breathing at this time , patient had a bilateral hip replacement last month incision side mild redness notice and small patient A&Ox3 in no acute distress c/o of mild difficulty breathing when walking " some distance" , patient denied  chest pain no difficulty breathing at this time , patient had a bilateral hip replacement last month incision side mild redness notice and small open wound on L hip, lower extremities swelling notice , patient here for evaluation low Hg level

## 2023-11-10 NOTE — ED PROVIDER NOTE - NS ED ATTENDING STATEMENT MOD
I have seen and examined this patient and fully participated in the care of this patient as the teaching attending.  The service was shared with the SOFY.  I reviewed and verified the documentation and independently performed the documented:

## 2023-11-10 NOTE — ED PROVIDER NOTE - DIFFERENTIAL DIAGNOSIS
Differentials include but not limited to anemia, electrolyte abnormality, dehydration Differential Diagnosis

## 2023-11-10 NOTE — ED PROVIDER NOTE - CLINICAL SUMMARY MEDICAL DECISION MAKING FREE TEXT BOX
Progress Notes by Edgar Baptiste NP at 2/19/2019  1:30 PM     Author: Edgar Baptiste NP Service: -- Author Type: Nurse Practitioner    Filed: 2/19/2019  2:57 PM Encounter Date: 2/19/2019 Status: Signed    : Edgar Baptiste NP (Nurse Practitioner)           Click to link to [facility] Heart Care     [facility] HEART CARE NOTE      Assessment/Recommendations   Assessment:    1. Dilated cardiomyopathy with systolic dysfunction, with left bundle branch block NYHA class III: Patient was recently admitted with shortness of breath secondary to COPD exacerbation.  His recent echocardiogram in February 2019 showed an improvement in ejection fraction from 25% to 45%. He is well compensated with no evidence of fluid retention.  He lost about 9-10 pounds since last heart failure clinic visit in January 2019.  Wife reports poor appetite.  He follows low-sodium diet.  Per wife, patient has fatigue, low energy and overall declining.    His blood pressure today is 112/62 and heart rate 68.  He is asymptomatic    Plan:  1. Given significant weight loss with no evidence of fluid retention, his diuretic was stopped and changed to as needed.  Patient and his wife were encouraged to call if patient experiences persistent weight gain 2-3 pounds 2 days in a row or 5 pounds in a week with worsening shortness of breath, fatigue, abdominal bloating, lightheadedness or swelling in lower extremities.    Heart failure medications:  - Beta blocker therapy with metroprolol succinate 25 mg daily at bedtime  - ACEI therapy with lisinopril 5 mg daily  -  Diuretic therapy with furosemide changed to as needed    2.  Continue low-sodium diet less than 2 g/day and daily weight monitoring    3.  We briefly discussed about referral to palliative care with Pat, patient's wife.  Pat wants to discuss further during next follow-up visit.    Follow up with me in 2-3 weeks and Dr. Moran in March as scheduled     History of Present Illness    Mr. Wise  DELFINA Garcias is a 85 y.o. male with a significant past medical history of COPD, bronchiectasis, chronic cough, urinary retention, anemia, and dilated cardiomyopathy with systolic dysfunction with left bundle branch block, who is seen at [facility] heart failure clinic today for continued follow-up.   His repeat echocardiogram in January 2019 showed improvement in left ventricular systolic function with EF of 45% with global hypokinesis, mild concentric left ventricular hypertrophy and aortic valve sclerosis noted.     Patient was recently admitted from January 29 - February 3, 2019 with shortness of breath secondary to COPD exacerbation.  He was treated with antibiotics and was discharged home on steroid.  He also had issues with urinary retention.  He was seen by urologist and recommended outpatient follow-up with possible cystoscopy.  Patient had couple episodes of gross hematuria but resolved spontaneously.    During last heart failure clinic visit, patient and his wife declined further medication changes to his heart failure regimen given history of low blood pressure with fatigue and excessive daytime sleepiness.  Per wife, patient continues to have fatigue, excessive daytime sleepiness, and overall declining.  He denies lightheadedness, dyspnea on exertion, orthopnea, PND, palpitations, chest pain, abdominal fullness/bloating and lower extremity edema.  He uses couple pillows for sleep during the night.  Per wife, his appetite is improving but still poor.  Pat, patient's wife is requesting for handicap parking.  She is going to discuss this with PCP this Friday.    His home weight during last heart clinic visit on 1/15/2019 was 154 pounds and now his weight is 145 pounds.     ECHO-Reviewed:   Results for orders placed during the hospital encounter of 01/29/19   Echo Complete [ECH10] 01/30/2019    Narrative   Mild concentric left ventricular hypertrophy.    Left ventricle ejection fraction is mildly  decreased. The calculated   left ventricular ejection fraction is 45% with global hypokinesis.    Normal right ventricular size with mildly reduced systolic function.    Aortic valve sclerosis    When compared to the previous study dated 10/18/2018, overall left   ventricular systolic function has improved.        Physical Examination Review of Systems   Vitals:    02/19/19 1326   BP: 112/62   Pulse: 68   Resp: 18     Body mass index is 23.4 kg/m .  Wt Readings from Last 3 Encounters:   02/19/19 145 lb (65.8 kg)   01/30/19 145 lb 6.4 oz (66 kg)   01/15/19 159 lb (72.1 kg)       General Appearance:     Alert, cooperative and in no acute distress but seems more forgetful.   ENT/Mouth: membranes moist, no oral lesions or bleeding gums.      EYES:  no scleral icterus, normal conjunctivae   Neck: no carotid bruits or thyromegaly   Chest/Lungs:   lungs are clear to auscultation, no rales or wheezing, respirations unlabored   Cardiovascular:    Heart rate regular. Normal first and second heart sounds with 2/6 systolic murmurs, no rubs, or gallops; the carotid, radial and posterior tibial pulses are intact, no edema bilateral lower extremities    Abdomen:  Soft, nontender, nondistended, bowel sounds present   Extremities: no cyanosis or clubbing   Skin: warm, dry.    Neurologic: mood and affect are appropriate, alert and oriented x3      General: Weight Loss  Eyes: WNL  Ears/Nose/Throat: Hearing Loss  Lungs: WNL  Heart: Shortness of Breath with activity  Stomach: WNL  Bladder: WNL  Muscle/Joints: WNL  Skin: WNL  Nervous System: Daytime Sleepiness  Mental Health: WNL     Blood: WNL     Medical History  Surgical History Family History Social History   Past Medical History:   Diagnosis Date   ? At risk for aspiration    ? Torres's esophagus    ? Bronchiectasis (H)    ? COPD (chronic obstructive pulmonary disease) (H)    ? Decreased cardiac ejection fraction 10/2018    EF 26%   ? Dilated cardiomyopathy (H)    ? DNAR (do not  attempt resuscitation)    ? Ejection fraction < 50%    ? Hiatal hernia    ? Hyperlipidemia    ? Left bundle branch block (LBBB) 2018   ? Lung nodule     Past Surgical History:   Procedure Laterality Date   ? HEMORRHOID SURGERY     ? [place] ESOPHAGOGASTRODUODENOSCOPY TRANSORAL DIAGNOSTIC N/A 10/21/2018    Procedure: ESOPHAGOGASTRODUODENOSCOPY (EGD) with biopsy;  Surgeon: Masoud Machuca MD;  Location: [facility];  Service: Gastroenterology   ? TONSILLECTOMY      Family History   Problem Relation Age of Onset   ? Cancer Mother    ? Other Father 35         in a car accident   ? No Medical Problems Brother    ? No Medical Problems Brother    ? No Medical Problems Sister    ? No Medical Problems Sister    ? No Medical Problems Sister    ? No Medical Problems Daughter    ? No Medical Problems Daughter     Social History     Socioeconomic History   ? Marital status:      Spouse name: Louann   ? Number of children: 2   ? Years of education: Not on file   ? Highest education level: Not on file   Social Needs   ? Financial resource strain: Not on file   ? Food insecurity - worry: Not on file   ? Food insecurity - inability: Not on file   ? Transportation needs - medical: Not on file   ? Transportation needs - non-medical: Not on file   Occupational History   ? Occupation: manufacturing work     Employer: RETIRED     Comment: [employer] plant in [place]   Tobacco Use   ? Smoking status: Former Smoker     Packs/day: 1.00     Years: 16.00     Pack years: 16.00     Types: Cigarettes     Last attempt to quit:      Years since quittin.1   ? Smokeless tobacco: Never Used   Substance and Sexual Activity   ? Alcohol use: No   ? Drug use: No   ? Sexual activity: Not Currently     Partners: Female   Other Topics Concern   ? Not on file   Social History Narrative    He lives with his wife.          Medications  Allergies   Current Outpatient Medications   Medication Sig Dispense Refill   ? albuterol  (PROVENTIL) 2.5 mg /3 mL (0.083 %) nebulizer solution Take 3 mL (2.5 mg total) by nebulization every 4 (four) hours as needed for wheezing. 40 vial 0   ? digoxin (LANOXIN) 125 mcg tablet Take 1 tablet (125 mcg total) by mouth daily with supper. 30 tablet 2   ? fluticasone-salmeterol (ADVAIR) 250-50 mcg/dose DISKUS Inhale 1 puff 2 (two) times a day.     ? furosemide (LASIX) 40 MG tablet Take 0.5 tablets (20 mg total) by mouth every other day. (Patient taking differently: Take 20 mg by mouth as needed.       ) 30 tablet 2   ? ipratropium-albuterol (DUO-NEB) 0.5-2.5 mg/3 mL nebulizer Take 3 mL by nebulization 4 (four) times a day. 3 mL 0   ? lisinopril (PRINIVIL,ZESTRIL) 5 MG tablet Take 1 tablet (5 mg total) by mouth at bedtime. 90 tablet 3   ? metoprolol succinate (TOPROL-XL) 25 MG Take 1 tablet (25 mg total) by mouth daily. (Patient taking differently: Take 25 mg by mouth at bedtime .      ) 45 tablet 2   ? psyllium (METAMUCIL) 0.52 gram capsule Take 0.52 g by mouth 2 (two) times a day.     ? senna-docusate (PERICOLACE) 8.6-50 mg tablet Take 1 tablet by mouth 2 (two) times a day.  0   ? tamsulosin (FLOMAX) 0.4 mg cap Take 1 capsule (0.4 mg total) by mouth daily. 30 capsule 2   ? triamcinolone (KENALOG) 0.1 % cream Apply to affected skin 2 times daily. 80 g 2   ? predniSONE (DELTASONE) 10 mg tablet pack Take by mouth daily. 3 tablets daily for 3 days starting from 2/4 then decrease 1 tablet every 3 days until done. 18 tablet 0     No current facility-administered medications for this visit.       Allergies   Allergen Reactions   ? Spironolactone Rash     patient broke out in very bad rash on both arms.          Lab Results    Chemistry CBC BNP   Lab Results   Component Value Date    CREATININE 0.89 02/03/2019    BUN 32 (H) 02/03/2019     02/03/2019    K 4.2 02/03/2019     02/03/2019    CO2 25 02/03/2019     Creatinine (mg/dL)   Date Value   02/03/2019 0.89   02/02/2019 0.87   01/31/2019 1.23   01/30/2019  1.76 (H)    Lab Results   Component Value Date    WBC 8.0 02/03/2019    HGB 11.5 (L) 02/03/2019    HCT 34.0 (L) 02/03/2019    MCV 96 02/03/2019     02/03/2019    Lab Results   Component Value Date     (H) 01/29/2019     BNP (pg/mL)   Date Value   01/29/2019 240 (H)   11/07/2018 145 (H)   10/17/2018 416 (H)        Edgar Baptiste [facility]   Heart Failure Clinic         This note has been dictated using voice recognition software. Any grammatical, typographical, or context distortions are unintentional and inherent to the software        67-year-old male recent hip surgery on Eliquis, presenting to the emergency department with concern for anemia, patient had blood work done outpatient with hemoglobin of 7.5, was not transfused, patient awake and alert, no acute distress, denies any signs of active bleeding, will check CBC.

## 2023-11-10 NOTE — ED PROVIDER NOTE - ATTENDING CONTRIBUTION TO CARE
67-year-old male recent hip surgery on Eliquis, presenting to the emergency department with concern for anemia, patient had blood work done outpatient with hemoglobin of 7.5, was not transfused, patient awake and alert, no acute distress, denies any signs of active bleeding, will check CBC.

## 2023-11-10 NOTE — ED PROVIDER NOTE - PATIENT PORTAL LINK FT
You can access the FollowMyHealth Patient Portal offered by [facility] by registering at the following website: [email or web address]. By joining [employer]s FollowMyHealth portal, you will also be able to view your health information using other applications (apps) compatible with our system.

## 2023-11-10 NOTE — ED PROVIDER NOTE - CARE PROVIDER_API CALL
Nicolás Rueda  Internal Medicine  [address] [place] [address]  Phone: [phone]  Fax: [phone]  Follow Up Time: 1-3 Days

## 2023-11-10 NOTE — ED PROVIDER NOTE - NSFOLLOWUPINSTRUCTIONS_ED_ALL_ED_FT
continue iron supplements   follow up with primary care provider, recommend to have hemoglobin rechecked 1 week      Anemia  Comparison of blood with a normal amount of red blood cells to blood with fewer red blood cells when a person has anemia.   Anemia is a condition in which there are not enough red blood cells or hemoglobin in the blood. Hemoglobin is a substance in red blood cells that carries oxygen.    When you do not have enough red blood cells or hemoglobin (are anemic), your body cannot get enough oxygen, and your organs may not work properly. As a result, you may feel very tired or have other problems.    What are the causes?  Common causes of anemia include:  Excessive bleeding. Anemia can be caused by excessive bleeding inside or outside the body, including bleeding from the intestines or from heavy menstrual periods in females.  Poor nutrition.  Long-lasting (chronic) kidney, thyroid, and liver disease.  Bone marrow disorders, spleen problems, and blood disorders.  Cancer and treatments for cancer.  Human immunodeficiency virus (HIV) and acquired immunodeficiency syndrome (AIDS).  Infections, medicines, and autoimmune disorders that destroy red blood cells.  What are the signs or symptoms?  Symptoms of this condition include:  Minor weakness.  Dizziness.  Headache, or difficulties concentrating and sleeping.  Heartbeats that feel irregular or faster than normal (palpitations).  Shortness of breath, especially with exercise.  Pale skin, lips, and nails, or cold hands and feet.  Upset stomach (indigestion) and nausea.  Symptoms may occur suddenly or develop slowly. If your anemia is mild, you may not have symptoms.    How is this diagnosed?  This condition is diagnosed based on blood tests, your medical history, and a physical exam. In some cases, a test may be needed in which cells are removed from the soft tissue inside of a bone and looked at under a microscope (bone marrow biopsy). Your health care provider may also check your stool (feces) for blood and may do more testing to look for the cause of your bleeding.    Other tests may include:  Imaging tests, such as a CT scan or MRI.  A procedure to see inside your esophagus and stomach (endoscopy). The esophagus is the part of the body that moves food from your mouth to your stomach.  A procedure to see inside your colon and rectum (colonoscopy).  How is this treated?  Treatment for this condition depends on the cause. If you continue to lose a lot of blood, you may need to be treated at a hospital. Treatment may include:  Taking supplements of iron, vitamin B12, or folic acid.  Taking a hormone medicine (erythropoietin) that can help to stimulate red blood cell growth.  Receiving donated blood through an IV (blood transfusion). This may be needed if you lose a lot of blood.  Making changes to your diet.  Having surgery to remove your spleen.  Follow these instructions at home:  Take over-the-counter and prescription medicines only as told by your health care provider.  Take supplements only as told by your health care provider.  Follow any diet instructions that you were given by your health care provider.  Keep all follow-up visits. Your health care provider will want to recheck your blood tests.  Contact a health care provider if:  You develop new bleeding anywhere in the body.  You are very weak.  Get help right away if:  You are short of breath.  You have pain in your abdomen or chest.  You are dizzy or feel faint.  You have trouble concentrating.  You have bloody stools, black stools, or tarry stools.  You vomit repeatedly or you vomit up blood.  These symptoms may be an emergency. Get help right away. Call 911.  Do not wait to see if the symptoms will go away.  Do not drive yourself to the hospital.  Summary  Anemia is a condition in which you do not have enough red blood cells or enough of a substance in your red blood cells that carries oxygen.  Symptoms may occur suddenly or develop slowly.  If your anemia is mild, you may not have symptoms.  This condition is diagnosed with blood tests, a medical history, and a physical exam. Other tests may be needed.  Treatment for this condition depends on the cause of the anemia.  This information is not intended to replace advice given to you by your health care provider. Make sure you discuss any questions you have with your health care provider.

## 2023-11-10 NOTE — ED ADULT NURSE NOTE - NSICDXPASTSURGICALHX_GEN_ALL_CORE_FT
PAST SURGICAL HISTORY:  History of lung biopsy     History of prostate biopsy     S/P total left hip arthroplasty 10/11/23

## 2023-11-13 ENCOUNTER — TRANSCRIPTION ENCOUNTER (OUTPATIENT)
Age: 68
End: 2023-11-13

## 2023-11-13 PROCEDURE — 36415 COLL VENOUS BLD VENIPUNCTURE: CPT

## 2023-11-13 PROCEDURE — 94664 DEMO&/EVAL PT USE INHALER: CPT

## 2023-11-13 PROCEDURE — 85027 COMPLETE CBC AUTOMATED: CPT

## 2023-11-13 PROCEDURE — 85730 THROMBOPLASTIN TIME PARTIAL: CPT

## 2023-11-13 PROCEDURE — 80053 COMPREHEN METABOLIC PANEL: CPT

## 2023-11-13 PROCEDURE — C1713: CPT

## 2023-11-13 PROCEDURE — 76000 FLUOROSCOPY <1 HR PHYS/QHP: CPT

## 2023-11-13 PROCEDURE — 80048 BASIC METABOLIC PNL TOTAL CA: CPT

## 2023-11-13 PROCEDURE — 97116 GAIT TRAINING THERAPY: CPT

## 2023-11-13 PROCEDURE — C1889: CPT

## 2023-11-13 PROCEDURE — C1776: CPT

## 2023-11-13 PROCEDURE — 27130 TOTAL HIP ARTHROPLASTY: CPT

## 2023-11-13 PROCEDURE — 97161 PT EVAL LOW COMPLEX 20 MIN: CPT

## 2023-11-13 PROCEDURE — 86901 BLOOD TYPING SEROLOGIC RH(D): CPT

## 2023-11-13 PROCEDURE — 97535 SELF CARE MNGMENT TRAINING: CPT

## 2023-11-13 PROCEDURE — 86900 BLOOD TYPING SEROLOGIC ABO: CPT

## 2023-11-13 PROCEDURE — 97530 THERAPEUTIC ACTIVITIES: CPT

## 2023-11-13 PROCEDURE — 97110 THERAPEUTIC EXERCISES: CPT

## 2023-11-13 PROCEDURE — 97165 OT EVAL LOW COMPLEX 30 MIN: CPT

## 2023-11-13 PROCEDURE — 86850 RBC ANTIBODY SCREEN: CPT

## 2023-11-13 PROCEDURE — 85610 PROTHROMBIN TIME: CPT

## 2023-11-17 ENCOUNTER — TRANSCRIPTION ENCOUNTER (OUTPATIENT)
Age: 68
End: 2023-11-17

## 2023-12-22 ENCOUNTER — APPOINTMENT (OUTPATIENT)
Dept: ORTHOPEDIC SURGERY | Facility: CLINIC | Age: 68
End: 2023-12-22
Payer: MEDICARE

## 2023-12-22 VITALS — HEART RATE: 77 BPM | DIASTOLIC BLOOD PRESSURE: 88 MMHG | SYSTOLIC BLOOD PRESSURE: 138 MMHG

## 2023-12-22 PROCEDURE — 99024 POSTOP FOLLOW-UP VISIT: CPT

## 2023-12-22 PROCEDURE — 73521 X-RAY EXAM HIPS BI 2 VIEWS: CPT

## 2023-12-22 NOTE — HISTORY OF PRESENT ILLNESS
[___ Months Post Op] : [unfilled] months post op [2] : the patient reports pain that is 2/10 in severity [Chills] : no chills [Constipation] : no constipation [Diarrhea] : no diarrhea [Dysuria] : no dysuria [Fever] : no fever [Nausea] : no nausea [Vomiting] : no vomiting [Clean/Dry/Intact] : clean, dry and intact [Healed] : healed [Erythema] : not erythematous [Discharge] : absent of discharge [Swelling] : not swollen [Dehiscence] : not dehisced [de-identified] : S/P bilateral hip replacements  Left: 10/11/23 Right: 10/18/23 [de-identified] : The patient is a 68-year-old gentleman who presents today for follow-up of his bilateral hips.  Patient states he is doing very well.  He is driving.  He is taking no medication.  He is ambulating independently.  He is very happy with the results [de-identified] : Well-healed incision to the right and left hip without erythema, drainage, or evidence of cellulitis.  Leg lengths appear equal on the table.  Patient has full range of motion of both of his hips without pain, discomfort, or instability.  There is no pain to palpation over the greater trochanteric regions or the iliotibial bands bilaterally.  His calves are soft, nontender, no evidence of DVT at this time.  Patient is good motor and sensory to both legs.  There is no numbness to the thighs. [de-identified] : Right and left total hip replacement, in anatomical alignment, excellent bone to prosthesis interface, there is no gross evidence of prosthetic failure, all the components anatomically aligned. [de-identified] : Stable postoperative course of a right and left anterior total hip replacement staged [de-identified] : The patient will continue an exercise program of walking, and strength training.  Patient was given a prescription for physical therapy.  He will continue with anti-inflammatories and other conservative treatment modalities.  All of his questions were answered to his satisfaction.  He will follow-up in several months

## 2024-02-16 ENCOUNTER — APPOINTMENT (OUTPATIENT)
Dept: ORTHOPEDIC SURGERY | Facility: CLINIC | Age: 69
End: 2024-02-16
Payer: MEDICARE

## 2024-02-16 VITALS
BODY MASS INDEX: 24.99 KG/M2 | DIASTOLIC BLOOD PRESSURE: 70 MMHG | WEIGHT: 150 LBS | SYSTOLIC BLOOD PRESSURE: 147 MMHG | HEART RATE: 49 BPM | HEIGHT: 65 IN

## 2024-02-16 DIAGNOSIS — Z96.643 PRESENCE OF ARTIFICIAL HIP JOINT, BILATERAL: ICD-10-CM

## 2024-02-16 PROCEDURE — 99214 OFFICE O/P EST MOD 30 MIN: CPT

## 2024-02-16 PROCEDURE — 73522 X-RAY EXAM HIPS BI 3-4 VIEWS: CPT

## 2024-02-16 NOTE — DISCUSSION/SUMMARY
[de-identified] : The patient was assured that they are progressing well post operatively. They were explained that the prosthesis is in perfect alignment, perfect placement and fixated well.  It was explained that they are progressing well and as expected. It is recommended that the pt continue with the current treatment plan. This includes an ongoing exercise program, ice/moist heat when needed and NSAID's when needed. It was explained that a good exercise routine will help with pain relief and improve the overall longevity of the prosthesis. The patient is advised to return to the office in another year or so for further evaluation and x-rays. However, if there is any increase in pain, redness, swelling, heat, discharge, fever, change in ambulation or pain. The patient is strongly advised to call the office sooner.  I, Dr. Miranda, personally performed the evaluation and management services for this established patient who presents today with an orthopedic condition. The evaluation and management includes conducting the conditions and establishing a plan of care.  Today[facility] was here to assist with the patient in my evaluation and management services for this condition which will be followed going forward.  35 minutes were spent, face to face, in direct consultation with the patient. This includes reviewing the natural history of their Dx., eliciting the history, performing an orthopedic exam, review of the x-ray findings, forming a differential Dx and discussing all treatment options. This Includes both surgical and non-surgical treatments. I also reviewed all the risks and benefits of non-operative & operative Tx options, future impact into orthopedic functions/problems, activity restrictions both at home and at work, and all follow up requirements.

## 2024-02-16 NOTE — PHYSICAL EXAM
[Normal] : Gait: normal [LE] : Sensory: Intact in bilateral lower extremities [ALL] : dorsalis pedis, posterior tibial, femoral, popliteal, and radial 2+ and symmetric bilaterally [de-identified] : On physical examination of the right hip.  Patient is status post right total hip replacement.  There is a well-healed surgical scar noted anteriorly.  There is no redness, swelling, heat, ecchymosis, discharge, pain or tenderness on examination.  The patient has excellent range of motion, passively, actively and against resistance, in all planes.  There is no evidence of limb length discrepancy.  No evidence of muscle atrophy. No palpable defect. No evidence of any motor or sensory deficit.  Patient has good distal pulses with no calf tenderness.  On physical examination of the left hip.  Patient is status post left total hip replacement.  There is a well-healed surgical scar noted anteriorly.  There is no redness, swelling, heat, ecchymosis, discharge, pain or tenderness on examination.  The patient has excellent range of motion, passively, actively and against resistance, in all planes.  There is no evidence of limb length discrepancy.  No evidence of muscle atrophy. No palpable defect. No evidence of any motor or sensory deficit.  Patient has good distal pulses with no calf tenderness. [de-identified] : X-rays of the right hip reveals status post right total hip replacement. In both views, the pelvis and right hip. The hardware is in place and shows excellent alignment as well as fixation. There is no evidence of limb length discrepancies. There is no evidence of any fracture, dislocation or loosening of the prosthesis.  X-rays of the left hip reveals a status post left total hip replacement. In both views, the pelvis and left hip. The hardware is in place and shows excellent alignment as well as fixation. There is no evidence of limb length discrepancies. There is no evidence of any fracture, dislocation, loosening of the prosthesis.

## 2024-02-16 NOTE — REASON FOR VISIT
[Follow-Up Visit] : a follow-up visit for [FreeTextEntry2] : S/P bilateral hip replacements Left: 10/11/23 Right: 10/18/23.

## 2024-02-16 NOTE — HISTORY OF PRESENT ILLNESS
[de-identified] : Patient is a 68-year-old male who presents today for an evaluation regarding his hips.  He is status post bilateral total hip replacements with the right being performed 2 months ago.  Overall he states he is doing very well and extremely happy following the progression of his therapy.  And states that he is ambulating much better with no real complaints.  He denies any history of new or recent injury.

## 2024-10-25 ENCOUNTER — APPOINTMENT (OUTPATIENT)
Dept: ORTHOPEDIC SURGERY | Facility: CLINIC | Age: 69
End: 2024-10-25

## 2024-10-25 DIAGNOSIS — Z96.643 PRESENCE OF ARTIFICIAL HIP JOINT, BILATERAL: ICD-10-CM

## 2024-10-25 PROCEDURE — 73522 X-RAY EXAM HIPS BI 3-4 VIEWS: CPT

## 2024-10-25 PROCEDURE — 99214 OFFICE O/P EST MOD 30 MIN: CPT

## 2024-12-16 NOTE — H&P PST ADULT - HISTORY OF PRESENT ILLNESS
HPI: This is a 69 year old male with a PMHx of HTN, HLD, COPD who presents for a [facility]. Patient had a coronary CTA which showed multiple 40-50% stenoses, with a total calcium score of 623 (.5, .4). Patient also underwent an NST which showed small to moderate sized, mild to moderate severity septal ischemia and 1-2mm horizontal ST depression in the lateral leads. Patient's ECG had T wave inversions indicating ischemia in the inferolateral leads. Patient's ECHO demonstrated EF between 60-65%, trace mitral regurgitation, and mild tricuspid regurgitation.     Symptoms:        Angina (Class):        Ischemic Symptoms:     Heart Failure:        Systolic/Diastolic/Combined: n/a       NYHA Class (within 2 weeks):     Assessment of LVEF (Must be within 6 months):       EF: 60-65%       Assessed by: ECHO        Date: 11/20/2024    Prior Cardiac Interventions:       PCI's (Date, Stents, Vessels): n/a       CABG (Date, Grafts): n/a    Noninvasive Testing:   Stress Test: Date: 11/20/2024       Protocol: Seaed       Duration of Exercise: 8.3 minutes        Symptoms: fatigue       EKG Changes: ST response was 1.5mm horizontal ST depression (mildly positive in the lateral leads during stress and recovery       DTS:        Myocardial Imaging: small to moderate sized, mild to moderate severity, septal, completely reversible defect consistent with ischemia. LV wall motion is abnormal. There is hypokinesis in the proximal and mid septal segments.        Risk Assessment (Low, Medium, High):     CT Angiogram: 11/25/2024  Total calcium score: 623  LM: 0  LAD: 260.5  Left circ: 68  RCA: 294.4    Echo (Date, Findings): 11/20/2024  Conclusions:   1. There is normal global left ventricular contractility  2. Overall left ventricular systolic function is normal with, an EF 60-65%  3. The diastolic filling pattern is normal for the age of the patient  4. There is trace MR  5. Mild tricuspid regurgitation present  6. No SBE ppx needed     Antianginal Therapies:        Beta Blockers:  coreg 6.25mg BID       Calcium Channel Blockers:        Long Acting Nitrates: norvasc 5mg daily       Ranexa:       Associated Risk Factors:        Cerebrovascular Disease: N/A       Chronic Lung Disease: N/A       Peripheral Arterial Disease: N/A       Chronic Kidney Disease (if yes, what is GFR): N/A       Uncontrolled Diabetes (if yes, what is HgbA1C or FBS): N/A       Poorly Controlled Hypertension (if yes, what is SBP): N/A       Morbid Obesity (if yes, what is BMI): N/A       History of Recent Ventricular Arrhythmia: N/A       Inability to Ambulate Safely: N/A       Need for Therapeutic Anticoagulation: N/A       Antiplatelet or Contrast Allergy: N/A         HPI: This is a 69 year old male with a PMHx of HTN, HLD, COPD who presents for a LHC.   Patient had a coronary CTA which showed multiple 40-50% stenoses, with a total calcium score of 623 (.5, .4). Patient also underwent an NST which showed small to moderate sized, mild to moderate severity septal ischemia and 1-2mm horizontal ST depression in the lateral leads. Patient's ECG had T wave inversions indicating ischemia in the inferolateral leads. Patient's ECHO demonstrated EF between 60-65%, trace mitral regurgitation, and mild tricuspid regurgitation.   Patient now presents to [facility] for C with possible intervention.    Symptoms:        Angina (Class):        Ischemic Symptoms:     Heart Failure: no       Systolic/Diastolic/Combined: n/a       NYHA Class (within 2 weeks):     Assessment of LVEF (Must be within 6 months):       EF: 60-65%       Assessed by: ECHO        Date: 11/20/2024    Prior Cardiac Interventions:       PCI's (Date, Stents, Vessels): n/a       CABG (Date, Grafts): n/a    Noninvasive Testing:   Stress Test: Date: 11/20/2024       Protocol: Saeed       Duration of Exercise: 8.3 minutes        Symptoms: fatigue       EKG Changes: ST response was 1.5mm horizontal ST depression (mildly positive in the lateral leads during stress and recovery       DTS:        Myocardial Imaging: small to moderate sized, mild to moderate severity, septal, completely reversible defect consistent with ischemia. LV wall motion is abnormal. There is hypokinesis in the proximal and mid septal segments.        Risk Assessment (Low, Medium, High):     CT Angiogram: 11/25/2024  Total calcium score: 623  LM: 0  LAD: 260.5  Left circ: 68  RCA: 294.4    Echo (Date, Findings): 11/20/2024  Conclusions:   1. There is normal global left ventricular contractility  2. Overall left ventricular systolic function is normal with, an EF 60-65%  3. The diastolic filling pattern is normal for the age of the patient  4. There is trace MR  5. Mild tricuspid regurgitation present  6. No SBE ppx needed     Antianginal Therapies:        Beta Blockers:  coreg 6.25mg BID       Calcium Channel Blockers:        Long Acting Nitrates: norvasc 5mg daily       Ranexa:       Associated Risk Factors:        Cerebrovascular Disease: N/A       Chronic Lung Disease: N/A       Peripheral Arterial Disease: N/A       Chronic Kidney Disease (if yes, what is GFR): N/A       Uncontrolled Diabetes (if yes, what is HgbA1C or FBS): N/A       Poorly Controlled Hypertension (if yes, what is SBP): N/A       Morbid Obesity (if yes, what is BMI): N/A       History of Recent Ventricular Arrhythmia: N/A       Inability to Ambulate Safely: N/A       Need for Therapeutic Anticoagulation: N/A       Antiplatelet or Contrast Allergy: N/A      VITALS       HPI: This is a 69 year old male who is a current smoker, with a PMHx of HTN, HLD, COPD, B/L Hip replacement in 11/23,  POSt op course C/B anemia from blood loss during procedure per pt, no blood  transfusion, HB stable now, who presents for a LHC.   Patient had a coronary CTA which showed multiple 40-50% stenoses, with a total calcium score of 623 (.5, .4). Patient also underwent an NST which showed small to moderate sized, mild to moderate severity septal ischemia and 1-2mm horizontal ST depression in the lateral leads. Patient's ECG had T wave inversions indicating ischemia in the inferolateral leads. Patient's ECHO demonstrated EF between 60-65%, trace mitral regurgitation, and mild tricuspid regurgitation.   Patient asymptomatic states that he never had any symptoms, denies HA, dizziness, CP, SOB, Palpitations, syncope.   Patient now presents to [facility] for LHC with possible intervention.    Symptoms:        Angina (Class): none       Ischemic Symptoms: none    Heart Failure: no       Systolic/Diastolic/Combined: n/a       NYHA Class (within 2 weeks):     Assessment of LVEF (Must be within 6 months):       EF: 60-65%       Assessed by: ECHO        Date: 11/20/2024    Prior Cardiac Interventions:       PCI's (Date, Stents, Vessels): n/a       CABG (Date, Grafts): n/a    Noninvasive Testing:   Stress Test: Date: 11/20/2024       Protocol: Saeed       Duration of Exercise: 8.3 minutes        Symptoms: fatigue       EKG Changes: ST response was 1.5mm horizontal ST depression (mildly positive in the lateral leads during stress and recovery       DTS:        Myocardial Imaging: small to moderate sized, mild to moderate severity, septal, completely reversible defect consistent with ischemia. LV wall motion is abnormal. There is hypokinesis in the proximal and mid septal segments.        Risk Assessment (Low, Medium, High):     CT Angiogram: 11/25/2024  Total calcium score: 623  LM: 0  LAD: 260.5  Left circ: 68  RCA: 294.4    Echo (Date, Findings): 11/20/2024  Conclusions:   1. There is normal global left ventricular contractility  2. Overall left ventricular systolic function is normal with, an EF 60-65%  3. The diastolic filling pattern is normal for the age of the patient  4. There is trace MR  5. Mild tricuspid regurgitation present  6. No SBE ppx needed     Antianginal Therapies:        Beta Blockers:  coreg 6.25mg BID       Calcium Channel Blockers:        Long Acting Nitrates: norvasc 5mg daily       Ranexa:       Associated Risk Factors:        Cerebrovascular Disease: N/A       Chronic Lung Disease: N/A       Peripheral Arterial Disease: N/A       Chronic Kidney Disease (if yes, what is GFR): N/A       Uncontrolled Diabetes (if yes, what is HgbA1C or FBS): N/A       Poorly Controlled Hypertension (if yes, what is SBP): N/A       Morbid Obesity (if yes, what is BMI): N/A       History of Recent Ventricular Arrhythmia: N/A       Inability to Ambulate Safely: N/A       Need for Therapeutic Anticoagulation: N/A       Antiplatelet or Contrast Allergy: N/A      VITALS    Vital Signs Last 24 Hrs  T(C): 36.7 (17 Dec 2024 11:00), Max: 36.7 (17 Dec 2024 11:00)  T(F): 98 (17 Dec 2024 11:00), Max: 98 (17 Dec 2024 11:00)  HR: 53 (17 Dec 2024 11:00) (53 - 53)  BP: 163/64 (17 Dec 2024 11:00) (163/64 - 163/64)  BP(mean): --  RR: 15 (17 Dec 2024 11:00) (15 - 15)  SpO2: 98% (17 Dec 2024 11:00) (98% - 98%)    Parameters below as of 17 Dec 2024 11:00  Patient On (Oxygen Delivery Method): room air

## 2024-12-16 NOTE — H&P PST ADULT - ASSESSMENT
Impression: This is a 69 year old male for LHC in the setting of abnormal calcium score and ECG suggestive of inferolateral ischemia.    Risk Assessments:  ASA:  Mallampati:  GFR:   Cr:  BRA:    Plan:  -plan for LHC via RRA/RFA  -patient seen and examined  -confirmed appropriate NPO duration  -ECG and Labs reviewed  -Aspirin 81mg po pre-cath  -NS 250cc bolus to be ordered for RONNIE ppx  Risks, benefits, and alternatives reviewed.  Risks including but not limited to MI, death, stroke, bleeding, infection, vessel injury, hematoma, renal failure, allergic reaction, urgent open heart surgery, restenosis and stent thrombosis were reviewed.  All questions answered.  Patient is agreeable to proceed.   Pt. assessed, appropriate for sedation, pt. educated regarding the plan for Versed/Fentanyl as needed.  -procedure discussed with patient; risks and benefits explained, questions answered  -consent obtained by attending IC Impression: This is a 69 year old male for LHC in the setting of abnormal calcium score and ECG suggestive of inferolateral ischemia.    Risk Assessments:  ASA:  Mallampati:  GFR:   Cr:  BRA:    Plan:  -plan for LHC via RRA/RFA  -patient seen and examined  -confirmed appropriate NPO duration  -ECG and Labs reviewed  -Aspirin 81mg po pre-cath  -NS 250cc bolus to be ordered for RONNIE ppx  Risks, benefits, and alternatives reviewed.  Risks including but not limited to MI, death, stroke, bleeding, infection, vessel injury, hematoma, renal failure, allergic reaction, urgent open heart surgery, restenosis and stent thrombosis were reviewed.  All questions answered.  Patient is agreeable to proceed.   Pt. assessed, appropriate for sedation, pt. educated regarding the plan for Versed/Fentanyl as needed.  -procedure discussed with patient; risks and benefits explained, questions answered  -consent obtained by attending dr Almazan Impression: This is a 69 year old male for LHC in the setting of abnormal calcium score and ECG suggestive of inferolateral ischemia.    Risk Assessments:  ASA:3  Mallampati:3  GFR: 57  Cr:1.34  BRA: 1.8%    Plan:  -plan for LHC via RRA/RFA  -patient seen and examined  -confirmed appropriate NPO duration  -ECG and Labs reviewed  -Aspirin 81mg po pre-cath  -NS 250cc bolus to be ordered for RONNIE ppx  Risks, benefits, and alternatives reviewed.  Risks including but not limited to MI, death, stroke, bleeding, infection, vessel injury, hematoma, renal failure, allergic reaction, urgent open heart surgery, restenosis and stent thrombosis were reviewed.  All questions answered.  Patient is agreeable to proceed.   Pt. assessed, appropriate for sedation, pt. educated regarding the plan for Versed/Fentanyl as needed.  -procedure discussed with patient; risks and benefits explained, questions answered  -consent obtained by attending dr Almazan

## 2024-12-17 ENCOUNTER — TRANSCRIPTION ENCOUNTER (OUTPATIENT)
Age: 69
End: 2024-12-17

## 2024-12-17 ENCOUNTER — OUTPATIENT (OUTPATIENT)
Dept: OUTPATIENT SERVICES | Facility: HOSPITAL | Age: 69
LOS: 1 days | Discharge: HOME | End: 2024-12-17
Payer: MEDICARE

## 2024-12-17 VITALS
RESPIRATION RATE: 15 BRPM | SYSTOLIC BLOOD PRESSURE: 163 MMHG | OXYGEN SATURATION: 98 % | DIASTOLIC BLOOD PRESSURE: 64 MMHG | HEART RATE: 53 BPM | TEMPERATURE: 98 F

## 2024-12-17 VITALS
DIASTOLIC BLOOD PRESSURE: 74 MMHG | OXYGEN SATURATION: 97 % | SYSTOLIC BLOOD PRESSURE: 134 MMHG | RESPIRATION RATE: 16 BRPM | HEART RATE: 53 BPM

## 2024-12-17 DIAGNOSIS — Z98.890 OTHER SPECIFIED POSTPROCEDURAL STATES: Chronic | ICD-10-CM

## 2024-12-17 DIAGNOSIS — Z96.642 PRESENCE OF LEFT ARTIFICIAL HIP JOINT: Chronic | ICD-10-CM

## 2024-12-17 DIAGNOSIS — R94.39 ABNORMAL RESULT OF OTHER CARDIOVASCULAR FUNCTION STUDY: ICD-10-CM

## 2024-12-17 LAB
ANION GAP SERPL CALC-SCNC: 12 MMOL/L — SIGNIFICANT CHANGE UP (ref 5–17)
APTT BLD: 35.6 SEC — SIGNIFICANT CHANGE UP (ref 24.5–35.6)
BASOPHILS # BLD AUTO: 0.02 K/UL — SIGNIFICANT CHANGE UP (ref 0–0.2)
BASOPHILS NFR BLD AUTO: 0.3 % — SIGNIFICANT CHANGE UP (ref 0–2)
BUN SERPL-MCNC: 14.4 MG/DL — SIGNIFICANT CHANGE UP (ref 8–20)
CALCIUM SERPL-MCNC: 10.1 MG/DL — SIGNIFICANT CHANGE UP (ref 8.4–10.5)
CHLORIDE SERPL-SCNC: 102 MMOL/L — SIGNIFICANT CHANGE UP (ref 96–108)
CO2 SERPL-SCNC: 25 MMOL/L — SIGNIFICANT CHANGE UP (ref 22–29)
CREAT SERPL-MCNC: 1.34 MG/DL — HIGH (ref 0.5–1.3)
EGFR: 57 ML/MIN/1.73M2 — LOW
EOSINOPHIL # BLD AUTO: 0.42 K/UL — SIGNIFICANT CHANGE UP (ref 0–0.5)
EOSINOPHIL NFR BLD AUTO: 6.6 % — HIGH (ref 0–6)
GLUCOSE SERPL-MCNC: 116 MG/DL — HIGH (ref 70–99)
HCT VFR BLD CALC: 38.3 % — LOW (ref 39–50)
HGB BLD-MCNC: 12 G/DL — LOW (ref 13–17)
IMM GRANULOCYTES NFR BLD AUTO: 0.6 % — SIGNIFICANT CHANGE UP (ref 0–0.9)
INR BLD: 1 RATIO — SIGNIFICANT CHANGE UP (ref 0.85–1.16)
LYMPHOCYTES # BLD AUTO: 1.56 K/UL — SIGNIFICANT CHANGE UP (ref 1–3.3)
LYMPHOCYTES # BLD AUTO: 24.6 % — SIGNIFICANT CHANGE UP (ref 13–44)
MCHC RBC-ENTMCNC: 26.7 PG — LOW (ref 27–34)
MCHC RBC-ENTMCNC: 31.3 G/DL — LOW (ref 32–36)
MCV RBC AUTO: 85.3 FL — SIGNIFICANT CHANGE UP (ref 80–100)
MONOCYTES # BLD AUTO: 0.63 K/UL — SIGNIFICANT CHANGE UP (ref 0–0.9)
MONOCYTES NFR BLD AUTO: 9.9 % — SIGNIFICANT CHANGE UP (ref 2–14)
NEUTROPHILS # BLD AUTO: 3.68 K/UL — SIGNIFICANT CHANGE UP (ref 1.8–7.4)
NEUTROPHILS NFR BLD AUTO: 58 % — SIGNIFICANT CHANGE UP (ref 43–77)
PLATELET # BLD AUTO: 221 K/UL — SIGNIFICANT CHANGE UP (ref 150–400)
POTASSIUM SERPL-MCNC: 4.3 MMOL/L — SIGNIFICANT CHANGE UP (ref 3.5–5.3)
POTASSIUM SERPL-SCNC: 4.3 MMOL/L — SIGNIFICANT CHANGE UP (ref 3.5–5.3)
PROTHROM AB SERPL-ACNC: 11.6 SEC — SIGNIFICANT CHANGE UP (ref 9.9–13.4)
RBC # BLD: 4.49 M/UL — SIGNIFICANT CHANGE UP (ref 4.2–5.8)
RBC # FLD: 14.6 % — HIGH (ref 10.3–14.5)
SODIUM SERPL-SCNC: 139 MMOL/L — SIGNIFICANT CHANGE UP (ref 135–145)
WBC # BLD: 6.35 K/UL — SIGNIFICANT CHANGE UP (ref 3.8–10.5)
WBC # FLD AUTO: 6.35 K/UL — SIGNIFICANT CHANGE UP (ref 3.8–10.5)

## 2024-12-17 PROCEDURE — 85730 THROMBOPLASTIN TIME PARTIAL: CPT

## 2024-12-17 PROCEDURE — 80048 BASIC METABOLIC PNL TOTAL CA: CPT

## 2024-12-17 PROCEDURE — C1887: CPT

## 2024-12-17 PROCEDURE — C1769: CPT

## 2024-12-17 PROCEDURE — 36415 COLL VENOUS BLD VENIPUNCTURE: CPT

## 2024-12-17 PROCEDURE — 93005 ELECTROCARDIOGRAM TRACING: CPT

## 2024-12-17 PROCEDURE — 93458 L HRT ARTERY/VENTRICLE ANGIO: CPT

## 2024-12-17 PROCEDURE — 85610 PROTHROMBIN TIME: CPT

## 2024-12-17 PROCEDURE — 85025 COMPLETE CBC W/AUTO DIFF WBC: CPT

## 2024-12-17 PROCEDURE — 93010 ELECTROCARDIOGRAM REPORT: CPT

## 2024-12-17 RX ORDER — TADALAFIL 20 MG/1
2 TABLET ORAL
Refills: 0 | DISCHARGE

## 2024-12-17 RX ORDER — LOSARTAN POTASSIUM 100 MG/1
0 TABLET, FILM COATED ORAL
Refills: 0 | DISCHARGE

## 2024-12-17 RX ORDER — ROSUVASTATIN CALCIUM 5 MG/1
1 TABLET, FILM COATED ORAL
Refills: 0 | DISCHARGE

## 2024-12-17 RX ORDER — LOSARTAN POTASSIUM 100 MG/1
1 TABLET, FILM COATED ORAL
Refills: 0 | DISCHARGE

## 2024-12-17 NOTE — DISCHARGE NOTE PROVIDER - NSDCQMPCI_CARD_ALL_CORE
[facility] at [facility]  Office visit  Kareem Steiner  5/3/1925  [phone]    VISIT DATE:  02/07/2018    PCP: Eloy Aguilar MD  [address] [id number]    CC:  Chief Complaint   Patient presents with   • Atrial Flutter   • AV heart block       PROBLEM LIST:  Symptomatic paroxysmal atrial flutter - CHADS Vas equal to 6  High risk for chronic anticoagulation  Chronic venous insufficiency  Peripheral vascular disease status post right carotid endarterectomy  History of TIA  Anemia of chronic disease  CKD stage III  High-grade AV block, asymptomatic    Previous cardiac studies and procedures:  June 2017 echo  · Left ventricular systolic function is normal. Estimated EF = 60%.  · Mild mitral valve regurgitation is present  · Mild tricuspid valve regurgitation is present.  · calcification of the aortic valve  · RVSP(TR) 35.6 mmHg       ASSESSMENT:   Diagnosis Plan   1. Mixed hyperlipidemia     2. Essential hypertension     3. Bradycardia     4. Atrial flutter, unspecified type (CMS/HCC)         PLAN:  Paroxysmal atrial flutter: Continue aspirin 325 mg by mouth daily for stroke prophylaxis, high risk for chronic anticoagulation due to frequent falls    Hypertension: Goal less than 150/90 mmHg.  Agree with current antihypertensive regimen.  Would be reasonable to wean back on his amlodipine in the future if we feel like he is having any orthostasis or if it is worsening his bilateral lower extremity edema.    Sinus bradycardia: Not recommending pacemaker implantation unless he becomes symptomatic with his bradyarrhythmia.    Venous insufficiency: Continue torsemide 20 mg by mouth daily.  Sodium restriction.  As needed compression stockings.  Right-sided ulceration with associated cellulitis is healing well.    Follow-up in 6 months    Subjective  Still generalized frailty and intermittent mechanical falls, in particular when he gets up from a seated position.  He denies  lightheadedness..  Appears to be in good spirits.  He refuses to use under version a wheeled walker with a break because he feels it is cumbersome.  Does use his old aluminum frame walker.  Recently was treated for right lower extremity ulcer with cellulitis, it appears to be healing well.  Denies chest pain or palpitations.    Denies presyncope or syncope.  No other acute issues.  He appears compliant with medical therapy.  Now on torsemide on a daily basis.    PHYSICAL EXAMINATION:  There were no vitals filed for this visit.  General Appearance:    Alert, cooperative, no distress, appears stated age   Head:    Normocephalic, without obvious abnormality, atraumatic   Eyes:    conjunctiva/corneas clear   Nose:   Nares normal, septum midline, mucosa normal, no drainage   Throat:   Lips, teeth and gums normal   Neck:   Supple, symmetrical, trachea midline, no carotid    bruit or JVD   Lungs:     Clear to auscultation bilaterally, respirations unlabored   Chest Wall:    No tenderness or deformity    Heart:    Regular rate and rhythm, S1 and S2 normal,2/6 early peaking systolic murmur right upper sternal border, rub   or gallop, normal carotid impulse bilaterally without bruit.   Abdomen:     Soft, non-tender   Extremities:   Extremities normal, atraumatic, no cyanosis, 3+ left lower extremity pitting edema, trivial 1+ right lower extremity pitting edema.     Pulses:   2+ and symmetric all extremities   Skin:   Skin color, texture, turgor normal, no rashes or lesions       Diagnostic Data:  Procedures  Lab Results   Component Value Date    TRIG 66 07/11/2018    HDL 71 (H) 07/11/2018     Lab Results   Component Value Date    GLUCOSE 95 07/11/2018    BUN 45 (H) 07/11/2018    CREATININE 2.61 (H) 07/11/2018     07/11/2018    K 5.0 07/11/2018     (H) 07/11/2018    CO2 22.0 07/11/2018     Lab Results   Component Value Date    HGBA1C 5.4 10/26/2014     Lab Results   Component Value Date    WBC 7.24 04/11/2018     HGB 10.0 (L) 04/11/2018    HCT 32.9 (L) 04/11/2018     04/11/2018       Allergies  Allergies   Allergen Reactions   • Atorvastatin Myalgia       Current Medications    Current Outpatient Prescriptions:   •  amLODIPine (NORVASC) 5 MG tablet, TAKE ONE TABLET BY MOUTH DAILY, Disp: 30 tablet, Rfl: 5  •  aspirin 325 MG tablet, Take 1 tablet by mouth daily., Disp: , Rfl:   •  Cetirizine HCl (ZYRTEC ALLERGY PO), Take 10 mg by mouth Daily., Disp: , Rfl:   •  coenzyme Q10 100 MG capsule, Take 100 mg by mouth Daily., Disp: , Rfl:   •  donepezil (ARICEPT) 10 MG tablet, TAKE ONE TABLET BY MOUTH DAILY, Disp: 30 tablet, Rfl: 2  •  famotidine (PEPCID) 20 MG tablet, TAKE ONE TABLET BY MOUTH TWICE A DAY, Disp: 60 tablet, Rfl: 4  •  hydrALAZINE (APRESOLINE) 10 MG tablet, TAKE ONE TABLET BY MOUTH THREE TIMES A DAY, Disp: 90 tablet, Rfl: 2  •  Iron, Ferrous Gluconate, 256 (28 Fe) MG tablet, Take  by mouth Daily., Disp: , Rfl:   •  Multiple Vitamins-Minerals (CENTRUM SILVER PO), Take  by mouth Daily., Disp: , Rfl:   •  Multiple Vitamins-Minerals (EYE VITAMINS) capsule, Take 1 capsule by mouth daily., Disp: , Rfl:   •  mupirocin (BACTROBAN) 2 % ointment, Apply  topically 3 (Three) Times a Day., Disp: 15 g, Rfl: 3  •  potassium chloride (K-DUR) 10 MEQ CR tablet, Take 10 mEq by mouth Daily., Disp: , Rfl:   •  Probiotic Product (PROBIOTIC ADVANCED PO), Take 1 capsule by mouth Daily., Disp: , Rfl:   •  sulfamethoxazole-trimethoprim (BACTRIM DS,SEPTRA DS) 800-160 MG per tablet, Take 1 tablet by mouth 2 (Two) Times a Day., Disp: 20 tablet, Rfl: 0  •  tamsulosin (FLOMAX) 0.4 MG capsule 24 hr capsule, Take 1 capsule by mouth Daily., Disp: , Rfl:   •  torsemide (DEMADEX) 20 MG tablet, Take 0.5 tablets by mouth Daily., Disp: 15 tablet, Rfl: 3          ROS  Review of Systems   Cardiovascular: Positive for leg swelling. Negative for chest pain and dyspnea on exertion.   Respiratory: Negative for cough and shortness of breath.          SOCIAL  HX  Social History     Social History   • Marital status:      Spouse name: N/A   • Number of children: N/A   • Years of education: N/A     Occupational History   • retired      Social History Main Topics   • Smoking status: Former Smoker     Types: Pipe   • Smokeless tobacco: Never Used      Comment: smoked a pipe daily for 50 yrs, quit 30 yrs ago   • Alcohol use No   • Drug use: No   • Sexual activity: Defer     Other Topics Concern   • Not on file     Social History Narrative    Patient consumes 3 serving of caffeine daily.     Patient lives at home with wife.            FAMILY HX  Family History   Problem Relation Age of Onset   • Diabetes Other    • Stroke Father    • Heart attack Father              Wil Gomez III, MD, FACC       No

## 2024-12-17 NOTE — ASU PATIENT PROFILE, ADULT - FALL HARM RISK - UNIVERSAL INTERVENTIONS
Bed in lowest position, wheels locked, appropriate side rails in place/Call bell, personal items and telephone in reach/Instruct patient to call for assistance before getting out of bed or chair/Non-slip footwear when patient is out of bed/[place] to call system/Physically safe environment - no spills, clutter or unnecessary equipment/Purposeful Proactive Rounding/Room/bathroom lighting operational, light cord in reach

## 2024-12-17 NOTE — DISCHARGE NOTE PROVIDER - NSDCCPTREATMENT_GEN_ALL_CORE_FT
PRINCIPAL PROCEDURE  Procedure: Left heart cardiac cath  Findings and Treatment:   S/P   Restricted use with no heavy lifting of affected arm for 48 hours.  No submerging the arm in water for 48 hours.  You may start showering today.  Call your doctor for any bleeding, swelling, loss of sensation in the hand or fingers, or fingers turning blue.  If heavy bleeding or large lumps form, hold pressure at the spot and come to the Emergency Room.

## 2024-12-17 NOTE — DISCHARGE NOTE NURSING/CASE MANAGEMENT/SOCIAL WORK - FINANCIAL ASSISTANCE
[facility] provides services at a reduced cost to those who are determined to be eligible through [facility]’s financial assistance program. Information regarding [facility]’s financial assistance program can be found by going to [email or web address] or by calling [phone].

## 2024-12-17 NOTE — DISCHARGE NOTE PROVIDER - HOSPITAL COURSE
Assessment      This is a 69 year old male who is a current smoker, with a PMHx of HTN, HLD, COPD, B/L Hip replacement in 11/23,  POSt op course C/B anemia from blood loss during procedure per pt, no blood  transfusion, HB stable now, who presents for a LHC.   Patient had a coronary CTA which showed multiple 40-50% stenoses, with a total calcium score of 623 (.5, .4). Patient also underwent an NST which showed small to moderate sized, mild to moderate severity septal ischemia and 1-2mm horizontal ST depression in the lateral leads. Patient's ECG had T wave inversions indicating ischemia in the inferolateral leads. Patient's ECHO demonstrated EF between 60-65%, trace mitral regurgitation, and mild tricuspid regurgitation.   Patient asymptomatic states that he never had any symptoms, denies HA, dizziness, CP, SOB, Palpitations, syncope.   Patient now presented  to [facility] for LHC with possible intervention.  Patient now s/p left heart catheterization via RRA  approach (RRB in place) with Dr. Almazan,  tolerated procedure well without complications.   RUE warm, perfusing, no bleeding or hematoma, distal pulses 2+, NV status intact       Intraprocedurally:  Pt received IV fentanyl  Midazolam: 1 mg iv   Verapamil: 5mg IA   Heparin IA: 4,000 UNITS    Prelim cath Findings as below:    S/PLHC VIA RRA  Non obstructive CAD   LVEEDP: 16    official cath  REPORT PENDING     # S/P LHC VIA RRA/ Non obstructive CAD   -post cardiac cath management per protocol  -Right radial precautions   -NS 0.9% 250ml/hr x 1 bolus: post procedure RONNIE ppx   -continue current medical therapy including   -C/W Aspirin, Statin,  BB,a nd rest of the home medication regimen   -follow up outpt in 2 weeks with Cardiologist DR Almazan  -Lifestyle modifications explained to the patient including weight management, consumption of haert  healthy diet, exercise, smoking cessation , RF modification explained to the patient and he verbalized understanding  -Discharge pt to home at 4 pm once criteria  met  -Discussed with DR Martinez

## 2024-12-17 NOTE — DISCHARGE NOTE PROVIDER - CARE PROVIDER_API CALL
Morris Almazan  Interventional Cardiology  [address] [place] [address], [address]  [place] [address]  Phone: [phone]  Fax: [phone]  Follow Up Time: 2 weeks

## 2024-12-17 NOTE — DISCHARGE NOTE NURSING/CASE MANAGEMENT/SOCIAL WORK - PATIENT PORTAL LINK FT
You can access the FollowMyHealth Patient Portal offered by [facility] by registering at the following website: [email or web address]. By joining [facility]’s FollowMyHealth portal, you will also be able to view your health information using other applications (apps) compatible with our system.

## 2024-12-17 NOTE — DISCHARGE NOTE PROVIDER - NSDCMRMEDTOKEN_GEN_ALL_CORE_FT
amLODIPine 5 mg oral tablet: 1 tab(s) orally once a day (at bedtime)  aspirin 81 mg oral tablet, chewable: 1 tab(s) chewed once a day  carvedilol 6.25 mg oral tablet: 1 tab(s) orally 2 times a day  famotidine 20 mg oral tablet: 1 tab(s) orally 2 times a day  losartan 100 mg oral tablet: 1 tab(s) orally once a day (at bedtime)  rosuvastatin 10 mg oral tablet: 1 tab(s) orally once a day (at bedtime)  tadalafil 10 mg oral tablet: 2 tab(s) orally once a day

## 2024-12-17 NOTE — PROGRESS NOTE ADULT - ASSESSMENT
HPI: This is a 69 year old male who is a current smoker, with a PMHx of HTN, HLD, COPD, B/L Hip replacement in 11/23,  POSt op course C/B anemia from blood loss during procedure per pt, no blood  transfusion, HB stable now, who presents for a LHC.   Patient had a coronary CTA which showed multiple 40-50% stenoses, with a total calcium score of 623 (.5, .4). Patient also underwent an NST which showed small to moderate sized, mild to moderate severity septal ischemia and 1-2mm horizontal ST depression in the lateral leads. Patient's ECG had T wave inversions indicating ischemia in the inferolateral leads. Patient's ECHO demonstrated EF between 60-65%, trace mitral regurgitation, and mild tricuspid regurgitation.   Patient asymptomatic states that he never had any symptoms, denies HA, dizziness, CP, SOB, Palpitations, syncope.   Patient now presented  to [facility] for LHC with possible intervention.  Patient now s/p left heart catheterization via RRA  approach (RRB in place) with Dr. Almazan,  tolerated procedure well without complications.   RUE warm, perfusing, no bleeding or hematoma, distal pulses 2+, NV status intact       Intraprocedurally:  Pt received IV fentanyl:cg  Midazolam: 1 mg iv   Verapamil: 5mg IA   Heparin IA: 4,000 UNITS    Prelim cath Findings as below:    S/PLHC VIA RRA  Non obstructive CAD   LVEEDP: 16    official cath  REPORT PENDING     # S/P LHC VIA RRA/ Non obstructive CAD   -post cardiac cath management per protocol  -Right radial precautions  -bedrest x 1chour  post procedure while RRB in place   -NS 0.9% 250ml/hr x 1 bolus: post procedure RONNIE ppx   -continue current medical therapy including   -C/W Aspirin, STtain, BB  -follow up outpt in 2 weeks with Cardiologist DR Almazan  -Lifestyle modifications explained to the patient including weight management, consumption of haert  healthy diet, exercise, smoking cessation , RF modification explained to the patient and he verbalized undersatnding  -Discharge pt to [facility] at 4 pm once criterai met  -Discussed with DR Martinez

## 2024-12-17 NOTE — PROGRESS NOTE ADULT - SUBJECTIVE AND OBJECTIVE BOX
Department of Cardiology                                                                  [facility]/[place] [address]                                                            Telephone: [phone]. Fax:[phone]                                                    Post- Procedure Note: Left Heart Cardiac Catheterization       Narrative:   Patient now s/p left heart catheterization via RRA  approach (RRB in place) with Dr. Almazan,  tolerated procedure well without complications. Arrived to recovery room NAD and hemodynamically stable, distal pulse +, neurovascular intact          PAST MEDICAL & SURGICAL HISTORY:  Hyperlipidemia      Hypertension      Mild coronary artery disease      History of irregular heartbeat      History of pneumonia      History of eczema      History of COVID-19      COVID-19 vaccine series completed      History of erectile dysfunction      History of gastritis      History of gastroesophageal reflux (GERD)      Mild emphysema      History of snoring      Constipation      History of kidney stones      History of elevated PSA      Lumbar spinal stenosis      Lumbar disc herniation      Primary osteoarthritis of lumbar spine      Primary osteoarthritis of left hip      Primary osteoarthritis of right hip      Sciatic pain, right      History of prostate biopsy      History of lung biopsy      S/P total left hip arthroplasty  10/11/23        Home Medications:  amLODIPine 5 mg oral tablet: 1 tab(s) orally once a day (at bedtime) (17 Dec 2024 13:41)  aspirin 81 mg oral tablet, chewable: 1 tab(s) chewed once a day (17 Dec 2024 13:40)  carvedilol 6.25 mg oral tablet: 1 tab(s) orally 2 times a day (17 Dec 2024 13:41)  famotidine 20 mg oral tablet: 1 tab(s) orally 2 times a day (17 Dec 2024 13:41)  losartan 100 mg oral tablet: 1 tab(s) orally once a day (17 Dec 2024 13:38)  losartan 100 mg oral tablet: 1 tab(s) orally once a day (at bedtime) (17 Dec 2024 13:41)  rosuvastatin 10 mg oral tablet: 1 tab(s) orally once a day (at bedtime) (17 Dec 2024 13:37)  tadalafil 10 mg oral tablet: 2 tab(s) orally once a day (17 Dec 2024 13:39)        No Known Allergies      Objective:  Vital Signs Last 24 Hrs  T(C): 36.7 (17 Dec 2024 11:00), Max: 36.7 (17 Dec 2024 11:00)  T(F): 98 (17 Dec 2024 11:00), Max: 98 (17 Dec 2024 11:00)  HR: 53 (17 Dec 2024 11:00) (53 - 53)  BP: 163/64 (17 Dec 2024 11:00) (163/64 - 163/64)  BP(mean): --  RR: 15 (17 Dec 2024 11:00) (15 - 15)  SpO2: 98% (17 Dec 2024 11:00) (98% - 98%)    Parameters below as of 17 Dec 2024 11:00  Patient On (Oxygen Delivery Method): room air        GENERAL: Pt lying comfortably, NAD.  ENMT: PERRL, +EOMI.  NECK: soft, Supple, No JVD,   CHEST/LUNG: Clear to auscultatation bilaterally; No wheezing.  HEART: S1S2+, Regular rate and rhythm; No murmurs.  ABDOMEN: Soft, Nontender, Nondistended; Bowel sounds present.  MUSCULOSKELETAL: Normal range of motion.  SKIN: No rashes or lesions.  NEURO: AAOX3, no focal deficits, no motor r sensory loss.  PSYCH: normal mood.  Procedure site: RRA  no signs of bleeding or hematoma, neurovascular intact   VASCULAR:   Radial +2 R/+2 L  Femoral +2 R/+2 L  PT +2 R/+2 L  DP +2 R/+2 L                          12.0   6.35  )-----------( 221      ( 17 Dec 2024 10:46 )             38.3     12-17    139  |  102  |  14.4  ----------------------------<  116[H]  4.3   |  25.0  |  1.34[H]    Ca    10.1      17 Dec 2024 10:46      PT/INR - ( 17 Dec 2024 10:46 )   PT: 11.6 sec;   INR: 1.00 ratio         PTT - ( 17 Dec 2024 10:46 )  PTT:35.6 sec

## (undated) DEVICE — VENODYNE/SCD SLEEVE CALF MEDIUM

## (undated) DEVICE — SUT VICRYL PLUS 1 27" CP UNDYED

## (undated) DEVICE — DRAPE TOP SHEET 53" X 101"

## (undated) DEVICE — DRAPE SUPINE ESYSUIT

## (undated) DEVICE — SUT STRATAFIX SPIRAL MONOCRYL PLUS 3-0 30CM PS-2 UNDYED

## (undated) DEVICE — DRAPE XL SHEET 77X98"

## (undated) DEVICE — POSITIONER POSITIONING ROLL  5X17"

## (undated) DEVICE — DRAPE MAYO STAND 30"

## (undated) DEVICE — DRAPE BILATERAL LIMB 72" X 120"

## (undated) DEVICE — NDL SPINAL 18G X 3.5" (PINK)

## (undated) DEVICE — DRSG DERMABOND PRINEO 60CM

## (undated) DEVICE — SUT POLYSORB 3-0 27" GS-11 UNDYED

## (undated) DEVICE — PACK TOTAL HIP

## (undated) DEVICE — HANDPIECE INTERPULSE W MULTI TIP

## (undated) DEVICE — DRAPE IOBAN 10" X 8"

## (undated) DEVICE — DRAPE C ARM 41X140"

## (undated) DEVICE — WARMING BLANKET UPPER ADULT

## (undated) DEVICE — HOOD FLYTE STRYKER HELMET SHIELD

## (undated) DEVICE — SUT STRATAFIX SPIRAL MONOCRYL PLUS 4-0 14CM PS-2 UNDYED

## (undated) DEVICE — WOUND IRR IRRISEPT W 0.5 CHG

## (undated) DEVICE — DRSG COBAN 6"

## (undated) DEVICE — SOL IRR POUR NS 0.9% 500ML

## (undated) DEVICE — DRSG STOCKINETTE TUBULAR COTTON 2PLY 6X72"

## (undated) DEVICE — ELCTR AQUAMANTYS BIPOLAR SEALER 6.0

## (undated) DEVICE — ELCTR ROCKER SWITCH PENCIL BLUE 10FT

## (undated) DEVICE — SOL IRR BAG NS 0.9% 3000ML

## (undated) DEVICE — DRSG STOCKINETTE IMPERVIOUS XL

## (undated) DEVICE — SUT QUILL PDO 0 36CM 36MM

## (undated) DEVICE — SAW BLADE STRYKER SAGITTAL AGGRESSIVE 25X86.5X1.32MM

## (undated) DEVICE — SOL IRR POUR H2O 1500ML

## (undated) DEVICE — SYR LUER LOK 50CC